# Patient Record
Sex: FEMALE | Race: OTHER | URBAN - METROPOLITAN AREA
[De-identification: names, ages, dates, MRNs, and addresses within clinical notes are randomized per-mention and may not be internally consistent; named-entity substitution may affect disease eponyms.]

---

## 2019-03-18 ENCOUNTER — INPATIENT (INPATIENT)
Facility: HOSPITAL | Age: 84
LOS: 6 days | Discharge: NURSING, REHAB OR LONG-TERM CARE | End: 2019-03-25
Attending: INTERNAL MEDICINE | Admitting: INTERNAL MEDICINE

## 2019-03-18 VITALS
TEMPERATURE: 98 F | HEART RATE: 90 BPM | OXYGEN SATURATION: 97 % | DIASTOLIC BLOOD PRESSURE: 84 MMHG | RESPIRATION RATE: 18 BRPM | SYSTOLIC BLOOD PRESSURE: 191 MMHG

## 2019-03-18 DIAGNOSIS — T50.905A ADVERSE EFFECT OF UNSPECIFIED DRUGS, MEDICAMENTS AND BIOLOGICAL SUBSTANCES, INITIAL ENCOUNTER: ICD-10-CM

## 2019-03-18 DIAGNOSIS — K58.0 IRRITABLE BOWEL SYNDROME WITH DIARRHEA: ICD-10-CM

## 2019-03-18 DIAGNOSIS — K52.1 TOXIC GASTROENTERITIS AND COLITIS: ICD-10-CM
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
## 2019-03-18 PROBLEM — Z00.00 ENCOUNTER FOR PREVENTIVE HEALTH EXAMINATION: Status: ACTIVE | Noted: 2019-03-18

## 2019-03-18 LAB
ALBUMIN SERPL ELPH-MCNC: 4.4 G/DL — SIGNIFICANT CHANGE UP (ref 3.5–5.2)
ALP SERPL-CCNC: 58 U/L — SIGNIFICANT CHANGE UP (ref 30–115)
ALT FLD-CCNC: 16 U/L — SIGNIFICANT CHANGE UP (ref 0–41)
ANION GAP SERPL CALC-SCNC: 11 MMOL/L — SIGNIFICANT CHANGE UP (ref 7–14)
APTT BLD: 31.9 SEC — SIGNIFICANT CHANGE UP (ref 27–39.2)
AST SERPL-CCNC: 21 U/L — SIGNIFICANT CHANGE UP (ref 0–41)
BASOPHILS # BLD AUTO: 0.03 K/UL — SIGNIFICANT CHANGE UP (ref 0–0.2)
BASOPHILS NFR BLD AUTO: 0.2 % — SIGNIFICANT CHANGE UP (ref 0–1)
BILIRUB SERPL-MCNC: 0.3 MG/DL — SIGNIFICANT CHANGE UP (ref 0.2–1.2)
BUN SERPL-MCNC: 14 MG/DL — SIGNIFICANT CHANGE UP (ref 10–20)
CALCIUM SERPL-MCNC: 9.8 MG/DL — SIGNIFICANT CHANGE UP (ref 8.5–10.1)
CHLORIDE SERPL-SCNC: 110 MMOL/L — SIGNIFICANT CHANGE UP (ref 98–110)
CO2 SERPL-SCNC: 21 MMOL/L — SIGNIFICANT CHANGE UP (ref 17–32)
CREAT SERPL-MCNC: 0.8 MG/DL — SIGNIFICANT CHANGE UP (ref 0.7–1.5)
EOSINOPHIL # BLD AUTO: 0 K/UL — SIGNIFICANT CHANGE UP (ref 0–0.7)
EOSINOPHIL NFR BLD AUTO: 0 % — SIGNIFICANT CHANGE UP (ref 0–8)
ETHANOL SERPL-MCNC: <10 MG/DL — HIGH
GLUCOSE SERPL-MCNC: 112 MG/DL — HIGH (ref 70–99)
HCT VFR BLD CALC: 43.7 % — SIGNIFICANT CHANGE UP (ref 37–47)
HGB BLD-MCNC: 14.3 G/DL — SIGNIFICANT CHANGE UP (ref 12–16)
IMM GRANULOCYTES NFR BLD AUTO: 0.4 % — HIGH (ref 0.1–0.3)
INR BLD: 0.99 RATIO — SIGNIFICANT CHANGE UP (ref 0.65–1.3)
LYMPHOCYTES # BLD AUTO: 1.65 K/UL — SIGNIFICANT CHANGE UP (ref 1.2–3.4)
LYMPHOCYTES # BLD AUTO: 12.2 % — LOW (ref 20.5–51.1)
MCHC RBC-ENTMCNC: 30 PG — SIGNIFICANT CHANGE UP (ref 27–31)
MCHC RBC-ENTMCNC: 32.7 G/DL — SIGNIFICANT CHANGE UP (ref 32–37)
MCV RBC AUTO: 91.6 FL — SIGNIFICANT CHANGE UP (ref 81–99)
MONOCYTES # BLD AUTO: 0.8 K/UL — HIGH (ref 0.1–0.6)
MONOCYTES NFR BLD AUTO: 5.9 % — SIGNIFICANT CHANGE UP (ref 1.7–9.3)
NEUTROPHILS # BLD AUTO: 10.96 K/UL — HIGH (ref 1.4–6.5)
NEUTROPHILS NFR BLD AUTO: 81.3 % — HIGH (ref 42.2–75.2)
NRBC # BLD: 0 /100 WBCS — SIGNIFICANT CHANGE UP (ref 0–0)
PLATELET # BLD AUTO: 190 K/UL — SIGNIFICANT CHANGE UP (ref 130–400)
POTASSIUM SERPL-MCNC: 4.2 MMOL/L — SIGNIFICANT CHANGE UP (ref 3.5–5)
POTASSIUM SERPL-SCNC: 4.2 MMOL/L — SIGNIFICANT CHANGE UP (ref 3.5–5)
PROT SERPL-MCNC: 6.8 G/DL — SIGNIFICANT CHANGE UP (ref 6–8)
PROTHROM AB SERPL-ACNC: 11.4 SEC — SIGNIFICANT CHANGE UP (ref 9.95–12.87)
RBC # BLD: 4.77 M/UL — SIGNIFICANT CHANGE UP (ref 4.2–5.4)
RBC # FLD: 13.7 % — SIGNIFICANT CHANGE UP (ref 11.5–14.5)
SODIUM SERPL-SCNC: 142 MMOL/L — SIGNIFICANT CHANGE UP (ref 135–146)
WBC # BLD: 13.49 K/UL — HIGH (ref 4.8–10.8)
WBC # FLD AUTO: 13.49 K/UL — HIGH (ref 4.8–10.8)

## 2019-03-18 RX ORDER — MORPHINE SULFATE 50 MG/1
2 CAPSULE, EXTENDED RELEASE ORAL ONCE
Qty: 0 | Refills: 0 | Status: DISCONTINUED | OUTPATIENT
Start: 2019-03-18 | End: 2019-03-18

## 2019-03-18 RX ORDER — MORPHINE SULFATE 50 MG/1
4 CAPSULE, EXTENDED RELEASE ORAL ONCE
Qty: 0 | Refills: 0 | Status: DISCONTINUED | OUTPATIENT
Start: 2019-03-18 | End: 2019-03-18

## 2019-03-18 RX ORDER — SODIUM CHLORIDE 9 MG/ML
1000 INJECTION INTRAMUSCULAR; INTRAVENOUS; SUBCUTANEOUS
Qty: 0 | Refills: 0 | Status: DISCONTINUED | OUTPATIENT
Start: 2019-03-18 | End: 2019-03-20

## 2019-03-18 RX ADMIN — SODIUM CHLORIDE 125 MILLILITER(S): 9 INJECTION INTRAMUSCULAR; INTRAVENOUS; SUBCUTANEOUS at 22:18

## 2019-03-18 RX ADMIN — MORPHINE SULFATE 2 MILLIGRAM(S): 50 CAPSULE, EXTENDED RELEASE ORAL at 22:21

## 2019-03-18 NOTE — ED ADULT NURSE NOTE - NSIMPLEMENTINTERV_GEN_ALL_ED
Implemented All Universal Safety Interventions:  [place] to call system. Call bell, personal items and telephone within reach. Instruct patient to call for assistance. Room bathroom lighting operational. Non-slip footwear when patient is off stretcher. Physically safe environment: no spills, clutter or unnecessary equipment. Stretcher in lowest position, wheels locked, appropriate side rails in place.
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
## 2019-03-19 LAB — TROPONIN T SERPL-MCNC: <0.01 NG/ML — SIGNIFICANT CHANGE UP

## 2019-03-19 RX ORDER — ATORVASTATIN CALCIUM 80 MG/1
40 TABLET, FILM COATED ORAL AT BEDTIME
Qty: 0 | Refills: 0 | Status: DISCONTINUED | OUTPATIENT
Start: 2019-03-19 | End: 2019-03-25

## 2019-03-19 RX ORDER — MECLIZINE HCL 12.5 MG
25 TABLET ORAL THREE TIMES A DAY
Qty: 0 | Refills: 0 | Status: DISCONTINUED | OUTPATIENT
Start: 2019-03-19 | End: 2019-03-25

## 2019-03-19 RX ORDER — ENOXAPARIN SODIUM 100 MG/ML
40 INJECTION SUBCUTANEOUS DAILY
Qty: 0 | Refills: 0 | Status: DISCONTINUED | OUTPATIENT
Start: 2019-03-19 | End: 2019-03-25

## 2019-03-19 RX ORDER — LIDOCAINE 4 G/100G
1 CREAM TOPICAL DAILY
Qty: 0 | Refills: 0 | Status: DISCONTINUED | OUTPATIENT
Start: 2019-03-19 | End: 2019-03-25

## 2019-03-19 RX ORDER — METOPROLOL TARTRATE 50 MG
25 TABLET ORAL
Qty: 0 | Refills: 0 | Status: DISCONTINUED | OUTPATIENT
Start: 2019-03-19 | End: 2019-03-25

## 2019-03-19 RX ORDER — ACETAMINOPHEN 500 MG
650 TABLET ORAL EVERY 6 HOURS
Qty: 0 | Refills: 0 | Status: DISCONTINUED | OUTPATIENT
Start: 2019-03-19 | End: 2019-03-20

## 2019-03-19 RX ORDER — CHLORHEXIDINE GLUCONATE 213 G/1000ML
1 SOLUTION TOPICAL
Qty: 0 | Refills: 0 | Status: DISCONTINUED | OUTPATIENT
Start: 2019-03-19 | End: 2019-03-25

## 2019-03-19 RX ORDER — CLOPIDOGREL BISULFATE 75 MG/1
75 TABLET, FILM COATED ORAL DAILY
Qty: 0 | Refills: 0 | Status: DISCONTINUED | OUTPATIENT
Start: 2019-03-19 | End: 2019-03-25

## 2019-03-19 RX ADMIN — Medication 25 MILLIGRAM(S): at 07:37

## 2019-03-19 RX ADMIN — CLOPIDOGREL BISULFATE 75 MILLIGRAM(S): 75 TABLET, FILM COATED ORAL at 11:33

## 2019-03-19 RX ADMIN — SODIUM CHLORIDE 125 MILLILITER(S): 9 INJECTION INTRAMUSCULAR; INTRAVENOUS; SUBCUTANEOUS at 22:25

## 2019-03-19 RX ADMIN — MORPHINE SULFATE 2 MILLIGRAM(S): 50 CAPSULE, EXTENDED RELEASE ORAL at 00:15

## 2019-03-19 RX ADMIN — Medication 650 MILLIGRAM(S): at 07:50

## 2019-03-19 RX ADMIN — Medication 10 MILLIGRAM(S): at 11:28

## 2019-03-19 RX ADMIN — MORPHINE SULFATE 4 MILLIGRAM(S): 50 CAPSULE, EXTENDED RELEASE ORAL at 00:55

## 2019-03-19 RX ADMIN — Medication 650 MILLIGRAM(S): at 22:09

## 2019-03-19 RX ADMIN — Medication 650 MILLIGRAM(S): at 13:17

## 2019-03-19 RX ADMIN — ENOXAPARIN SODIUM 40 MILLIGRAM(S): 100 INJECTION SUBCUTANEOUS at 11:28

## 2019-03-19 RX ADMIN — ATORVASTATIN CALCIUM 40 MILLIGRAM(S): 80 TABLET, FILM COATED ORAL at 21:17

## 2019-03-19 RX ADMIN — LIDOCAINE 1 PATCH: 4 CREAM TOPICAL at 22:35

## 2019-03-19 RX ADMIN — Medication 650 MILLIGRAM(S): at 23:32

## 2019-03-19 RX ADMIN — Medication 650 MILLIGRAM(S): at 07:22

## 2019-03-19 RX ADMIN — LIDOCAINE 1 PATCH: 4 CREAM TOPICAL at 19:58

## 2019-03-19 RX ADMIN — LIDOCAINE 1 PATCH: 4 CREAM TOPICAL at 11:27

## 2019-03-19 RX ADMIN — Medication 25 MILLIGRAM(S): at 17:05

## 2019-03-19 NOTE — ED PROVIDER NOTE - PHYSICAL EXAMINATION
CONSTITUTIONAL: Well-appearing; well-nourished; in no apparent distress.   EYES: PERRL; EOM intact.   CARDIOVASCULAR: Normal S1, S2; no murmurs, rubs, or gallops.   RESPIRATORY: Normal chest excursion with respiration; breath sounds clear and equal bilaterally; no wheezes, rhonchi, or rales.  GI/: Normal bowel sounds; non-distended; non-tender; no palpable organomegaly.   MS: + ttp over the b/l anterior chest wall. no crepitus. + ttp over the anterior right knee with painful ROM. + mild joint effusion. 2+ DP pules. right leg n/v intact. b/l hip non-tender. no no pain on ROM. pelvis stable.   SKIN: + mild ecchymosis noted to right anterior chest and right knee.   NEURO/PSYCH: A & O x 4; grossly unremarkable.
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
## 2019-03-19 NOTE — ED PROVIDER NOTE - CLINICAL SUMMARY MEDICAL DECISION MAKING FREE TEXT BOX
I personally evaluated the patient. I reviewed the Resident’s or Physician Assistant’s note (as assigned above), and agree with the findings and plan except as documented in my note. Patient is unable to ambulate, Patient ct displays no evidence of tibial platue fracture. Will admit for inaibility to ambulate
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
## 2019-03-19 NOTE — ED PROVIDER NOTE - PROGRESS NOTE DETAILS
attempted to walk patient, patient couldn't ambulate CT knee ordered CT knee neg for fracture. unable to ambulate. will admit for further management
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
## 2019-03-20 LAB
ANION GAP SERPL CALC-SCNC: 13 MMOL/L — SIGNIFICANT CHANGE UP (ref 7–14)
BASOPHILS # BLD AUTO: 0.03 K/UL — SIGNIFICANT CHANGE UP (ref 0–0.2)
BASOPHILS NFR BLD AUTO: 0.3 % — SIGNIFICANT CHANGE UP (ref 0–1)
BUN SERPL-MCNC: 8 MG/DL — LOW (ref 10–20)
CALCIUM SERPL-MCNC: 8.8 MG/DL — SIGNIFICANT CHANGE UP (ref 8.5–10.1)
CHLORIDE SERPL-SCNC: 109 MMOL/L — SIGNIFICANT CHANGE UP (ref 98–110)
CO2 SERPL-SCNC: 22 MMOL/L — SIGNIFICANT CHANGE UP (ref 17–32)
CREAT SERPL-MCNC: 0.6 MG/DL — LOW (ref 0.7–1.5)
EOSINOPHIL # BLD AUTO: 0 K/UL — SIGNIFICANT CHANGE UP (ref 0–0.7)
EOSINOPHIL NFR BLD AUTO: 0 % — SIGNIFICANT CHANGE UP (ref 0–8)
GLUCOSE SERPL-MCNC: 99 MG/DL — SIGNIFICANT CHANGE UP (ref 70–99)
HCT VFR BLD CALC: 42.2 % — SIGNIFICANT CHANGE UP (ref 37–47)
HGB BLD-MCNC: 13.7 G/DL — SIGNIFICANT CHANGE UP (ref 12–16)
IMM GRANULOCYTES NFR BLD AUTO: 0.4 % — HIGH (ref 0.1–0.3)
LYMPHOCYTES # BLD AUTO: 1.38 K/UL — SIGNIFICANT CHANGE UP (ref 1.2–3.4)
LYMPHOCYTES # BLD AUTO: 11.6 % — LOW (ref 20.5–51.1)
MAGNESIUM SERPL-MCNC: 1.9 MG/DL — SIGNIFICANT CHANGE UP (ref 1.8–2.4)
MCHC RBC-ENTMCNC: 29.7 PG — SIGNIFICANT CHANGE UP (ref 27–31)
MCHC RBC-ENTMCNC: 32.5 G/DL — SIGNIFICANT CHANGE UP (ref 32–37)
MCV RBC AUTO: 91.3 FL — SIGNIFICANT CHANGE UP (ref 81–99)
MONOCYTES # BLD AUTO: 0.57 K/UL — SIGNIFICANT CHANGE UP (ref 0.1–0.6)
MONOCYTES NFR BLD AUTO: 4.8 % — SIGNIFICANT CHANGE UP (ref 1.7–9.3)
NEUTROPHILS # BLD AUTO: 9.9 K/UL — HIGH (ref 1.4–6.5)
NEUTROPHILS NFR BLD AUTO: 82.9 % — HIGH (ref 42.2–75.2)
NRBC # BLD: 0 /100 WBCS — SIGNIFICANT CHANGE UP (ref 0–0)
PHOSPHATE SERPL-MCNC: 2.5 MG/DL — SIGNIFICANT CHANGE UP (ref 2.1–4.9)
PLATELET # BLD AUTO: 231 K/UL — SIGNIFICANT CHANGE UP (ref 130–400)
POTASSIUM SERPL-MCNC: 3.7 MMOL/L — SIGNIFICANT CHANGE UP (ref 3.5–5)
POTASSIUM SERPL-SCNC: 3.7 MMOL/L — SIGNIFICANT CHANGE UP (ref 3.5–5)
RBC # BLD: 4.62 M/UL — SIGNIFICANT CHANGE UP (ref 4.2–5.4)
RBC # FLD: 13.8 % — SIGNIFICANT CHANGE UP (ref 11.5–14.5)
SODIUM SERPL-SCNC: 144 MMOL/L — SIGNIFICANT CHANGE UP (ref 135–146)
WBC # BLD: 11.93 K/UL — HIGH (ref 4.8–10.8)
WBC # FLD AUTO: 11.93 K/UL — HIGH (ref 4.8–10.8)

## 2019-03-20 RX ORDER — OXYCODONE HYDROCHLORIDE 5 MG/1
5 TABLET ORAL EVERY 6 HOURS
Qty: 0 | Refills: 0 | Status: DISCONTINUED | OUTPATIENT
Start: 2019-03-20 | End: 2019-03-20

## 2019-03-20 RX ORDER — ACETAMINOPHEN 500 MG
650 TABLET ORAL EVERY 6 HOURS
Qty: 0 | Refills: 0 | Status: DISCONTINUED | OUTPATIENT
Start: 2019-03-20 | End: 2019-03-24

## 2019-03-20 RX ORDER — ONDANSETRON 8 MG/1
4 TABLET, FILM COATED ORAL ONCE
Qty: 0 | Refills: 0 | Status: COMPLETED | OUTPATIENT
Start: 2019-03-20 | End: 2019-03-20

## 2019-03-20 RX ORDER — TRAMADOL HYDROCHLORIDE 50 MG/1
50 TABLET ORAL EVERY 6 HOURS
Qty: 0 | Refills: 0 | Status: DISCONTINUED | OUTPATIENT
Start: 2019-03-20 | End: 2019-03-23

## 2019-03-20 RX ADMIN — Medication 650 MILLIGRAM(S): at 09:45

## 2019-03-20 RX ADMIN — ONDANSETRON 4 MILLIGRAM(S): 8 TABLET, FILM COATED ORAL at 06:22

## 2019-03-20 RX ADMIN — Medication 650 MILLIGRAM(S): at 17:06

## 2019-03-20 RX ADMIN — Medication 650 MILLIGRAM(S): at 05:17

## 2019-03-20 RX ADMIN — Medication 650 MILLIGRAM(S): at 06:26

## 2019-03-20 RX ADMIN — CLOPIDOGREL BISULFATE 75 MILLIGRAM(S): 75 TABLET, FILM COATED ORAL at 11:24

## 2019-03-20 RX ADMIN — ATORVASTATIN CALCIUM 40 MILLIGRAM(S): 80 TABLET, FILM COATED ORAL at 21:38

## 2019-03-20 RX ADMIN — Medication 650 MILLIGRAM(S): at 08:42

## 2019-03-20 RX ADMIN — Medication 25 MILLIGRAM(S): at 05:15

## 2019-03-20 RX ADMIN — Medication 10 MILLIGRAM(S): at 11:24

## 2019-03-20 RX ADMIN — Medication 650 MILLIGRAM(S): at 12:32

## 2019-03-20 RX ADMIN — LIDOCAINE 1 PATCH: 4 CREAM TOPICAL at 19:30

## 2019-03-20 RX ADMIN — Medication 650 MILLIGRAM(S): at 11:24

## 2019-03-20 RX ADMIN — LIDOCAINE 1 PATCH: 4 CREAM TOPICAL at 23:25

## 2019-03-20 RX ADMIN — Medication 25 MILLIGRAM(S): at 17:06

## 2019-03-20 RX ADMIN — Medication 650 MILLIGRAM(S): at 23:26

## 2019-03-20 RX ADMIN — LIDOCAINE 1 PATCH: 4 CREAM TOPICAL at 11:25

## 2019-03-20 RX ADMIN — Medication 650 MILLIGRAM(S): at 17:39

## 2019-03-20 RX ADMIN — SODIUM CHLORIDE 125 MILLILITER(S): 9 INJECTION INTRAMUSCULAR; INTRAVENOUS; SUBCUTANEOUS at 05:51

## 2019-03-20 RX ADMIN — ENOXAPARIN SODIUM 40 MILLIGRAM(S): 100 INJECTION SUBCUTANEOUS at 11:25
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
## 2019-03-20 NOTE — PHYSICAL THERAPY INITIAL EVALUATION ADULT - LEVEL OF INDEPENDENCE: GAIT, REHAB EVAL
Alert and oriented to person, place, time/situation. normal mood and affect. no apparent risk to self or others.
contact guard

## 2019-03-20 NOTE — PHYSICAL THERAPY INITIAL EVALUATION ADULT - IMPAIRMENTS CONTRIBUTING TO GAIT DEVIATIONS, PT EVAL
Pt with c/o pain in her R breast with each step when she places R foot on the floor and bears weight/pain/impaired balance

## 2019-03-21 ENCOUNTER — TRANSCRIPTION ENCOUNTER (OUTPATIENT)
Age: 84
End: 2019-03-21

## 2019-03-21 LAB
ANION GAP SERPL CALC-SCNC: 12 MMOL/L — SIGNIFICANT CHANGE UP (ref 7–14)
BUN SERPL-MCNC: 12 MG/DL — SIGNIFICANT CHANGE UP (ref 10–20)
CALCIUM SERPL-MCNC: 8.9 MG/DL — SIGNIFICANT CHANGE UP (ref 8.5–10.1)
CHLORIDE SERPL-SCNC: 111 MMOL/L — HIGH (ref 98–110)
CO2 SERPL-SCNC: 24 MMOL/L — SIGNIFICANT CHANGE UP (ref 17–32)
CREAT SERPL-MCNC: 0.6 MG/DL — LOW (ref 0.7–1.5)
GLUCOSE SERPL-MCNC: 91 MG/DL — SIGNIFICANT CHANGE UP (ref 70–99)
HCT VFR BLD CALC: 38.1 % — SIGNIFICANT CHANGE UP (ref 37–47)
HGB BLD-MCNC: 12.2 G/DL — SIGNIFICANT CHANGE UP (ref 12–16)
MCHC RBC-ENTMCNC: 29.4 PG — SIGNIFICANT CHANGE UP (ref 27–31)
MCHC RBC-ENTMCNC: 32 G/DL — SIGNIFICANT CHANGE UP (ref 32–37)
MCV RBC AUTO: 91.8 FL — SIGNIFICANT CHANGE UP (ref 81–99)
NRBC # BLD: 0 /100 WBCS — SIGNIFICANT CHANGE UP (ref 0–0)
PLATELET # BLD AUTO: 224 K/UL — SIGNIFICANT CHANGE UP (ref 130–400)
POTASSIUM SERPL-MCNC: 3.9 MMOL/L — SIGNIFICANT CHANGE UP (ref 3.5–5)
POTASSIUM SERPL-SCNC: 3.9 MMOL/L — SIGNIFICANT CHANGE UP (ref 3.5–5)
RBC # BLD: 4.15 M/UL — LOW (ref 4.2–5.4)
RBC # FLD: 13.7 % — SIGNIFICANT CHANGE UP (ref 11.5–14.5)
SODIUM SERPL-SCNC: 147 MMOL/L — HIGH (ref 135–146)
WBC # BLD: 10.04 K/UL — SIGNIFICANT CHANGE UP (ref 4.8–10.8)
WBC # FLD AUTO: 10.04 K/UL — SIGNIFICANT CHANGE UP (ref 4.8–10.8)

## 2019-03-21 RX ORDER — METHOCARBAMOL 500 MG/1
750 TABLET, FILM COATED ORAL EVERY 8 HOURS
Qty: 0 | Refills: 0 | Status: DISCONTINUED | OUTPATIENT
Start: 2019-03-21 | End: 2019-03-23

## 2019-03-21 RX ORDER — ACETAMINOPHEN 500 MG
2 TABLET ORAL
Qty: 0 | Refills: 0 | DISCHARGE
Start: 2019-03-21

## 2019-03-21 RX ORDER — PANTOPRAZOLE SODIUM 20 MG/1
40 TABLET, DELAYED RELEASE ORAL
Qty: 0 | Refills: 0 | Status: DISCONTINUED | OUTPATIENT
Start: 2019-03-21 | End: 2019-03-25

## 2019-03-21 RX ORDER — TRAMADOL HYDROCHLORIDE 50 MG/1
1 TABLET ORAL
Qty: 0 | Refills: 0 | DISCHARGE
Start: 2019-03-21

## 2019-03-21 RX ORDER — ACETAMINOPHEN 500 MG
2 TABLET ORAL
Qty: 0 | Refills: 0 | COMMUNITY
Start: 2019-03-21

## 2019-03-21 RX ADMIN — Medication 650 MILLIGRAM(S): at 12:30

## 2019-03-21 RX ADMIN — ATORVASTATIN CALCIUM 40 MILLIGRAM(S): 80 TABLET, FILM COATED ORAL at 21:14

## 2019-03-21 RX ADMIN — Medication 25 MILLIGRAM(S): at 05:03

## 2019-03-21 RX ADMIN — METHOCARBAMOL 750 MILLIGRAM(S): 500 TABLET, FILM COATED ORAL at 13:13

## 2019-03-21 RX ADMIN — METHOCARBAMOL 750 MILLIGRAM(S): 500 TABLET, FILM COATED ORAL at 08:53

## 2019-03-21 RX ADMIN — Medication 10 MILLIGRAM(S): at 12:30

## 2019-03-21 RX ADMIN — METHOCARBAMOL 750 MILLIGRAM(S): 500 TABLET, FILM COATED ORAL at 21:13

## 2019-03-21 RX ADMIN — Medication 650 MILLIGRAM(S): at 05:03

## 2019-03-21 RX ADMIN — ENOXAPARIN SODIUM 40 MILLIGRAM(S): 100 INJECTION SUBCUTANEOUS at 12:31

## 2019-03-21 RX ADMIN — CLOPIDOGREL BISULFATE 75 MILLIGRAM(S): 75 TABLET, FILM COATED ORAL at 12:30

## 2019-03-21 RX ADMIN — Medication 25 MILLIGRAM(S): at 17:44

## 2019-03-21 RX ADMIN — Medication 650 MILLIGRAM(S): at 17:44

## 2019-03-21 RX ADMIN — Medication 650 MILLIGRAM(S): at 00:06

## 2019-03-21 RX ADMIN — LIDOCAINE 1 PATCH: 4 CREAM TOPICAL at 12:31

## 2019-03-21 RX ADMIN — Medication 650 MILLIGRAM(S): at 06:27

## 2019-03-21 RX ADMIN — TRAMADOL HYDROCHLORIDE 50 MILLIGRAM(S): 50 TABLET ORAL at 21:13
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
## 2019-03-21 NOTE — DISCHARGE NOTE PROVIDER - CARE PROVIDER_API CALL
Galen Moralez)  Internal Medicine  [address] [place] [address]  Phone: [phone]  Fax: [phone]  Follow Up Time:
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
## 2019-03-22 ENCOUNTER — TRANSCRIPTION ENCOUNTER (OUTPATIENT)
Age: 84
End: 2019-03-22

## 2019-03-22 LAB
ANION GAP SERPL CALC-SCNC: 12 MMOL/L — SIGNIFICANT CHANGE UP (ref 7–14)
BUN SERPL-MCNC: 13 MG/DL — SIGNIFICANT CHANGE UP (ref 10–20)
CALCIUM SERPL-MCNC: 9.1 MG/DL — SIGNIFICANT CHANGE UP (ref 8.5–10.1)
CHLORIDE SERPL-SCNC: 109 MMOL/L — SIGNIFICANT CHANGE UP (ref 98–110)
CO2 SERPL-SCNC: 23 MMOL/L — SIGNIFICANT CHANGE UP (ref 17–32)
CREAT SERPL-MCNC: 0.6 MG/DL — LOW (ref 0.7–1.5)
GLUCOSE SERPL-MCNC: 93 MG/DL — SIGNIFICANT CHANGE UP (ref 70–99)
HCT VFR BLD CALC: 39.1 % — SIGNIFICANT CHANGE UP (ref 37–47)
HGB BLD-MCNC: 12.6 G/DL — SIGNIFICANT CHANGE UP (ref 12–16)
MCHC RBC-ENTMCNC: 29.6 PG — SIGNIFICANT CHANGE UP (ref 27–31)
MCHC RBC-ENTMCNC: 32.2 G/DL — SIGNIFICANT CHANGE UP (ref 32–37)
MCV RBC AUTO: 91.8 FL — SIGNIFICANT CHANGE UP (ref 81–99)
NRBC # BLD: 0 /100 WBCS — SIGNIFICANT CHANGE UP (ref 0–0)
PLATELET # BLD AUTO: 253 K/UL — SIGNIFICANT CHANGE UP (ref 130–400)
POTASSIUM SERPL-MCNC: 3.7 MMOL/L — SIGNIFICANT CHANGE UP (ref 3.5–5)
POTASSIUM SERPL-SCNC: 3.7 MMOL/L — SIGNIFICANT CHANGE UP (ref 3.5–5)
RBC # BLD: 4.26 M/UL — SIGNIFICANT CHANGE UP (ref 4.2–5.4)
RBC # FLD: 13.6 % — SIGNIFICANT CHANGE UP (ref 11.5–14.5)
SODIUM SERPL-SCNC: 144 MMOL/L — SIGNIFICANT CHANGE UP (ref 135–146)
WBC # BLD: 10.93 K/UL — HIGH (ref 4.8–10.8)
WBC # FLD AUTO: 10.93 K/UL — HIGH (ref 4.8–10.8)

## 2019-03-22 RX ORDER — METHOCARBAMOL 500 MG/1
1 TABLET, FILM COATED ORAL
Qty: 0 | Refills: 0 | COMMUNITY
Start: 2019-03-22

## 2019-03-22 RX ORDER — PANTOPRAZOLE SODIUM 20 MG/1
1 TABLET, DELAYED RELEASE ORAL
Qty: 0 | Refills: 0 | DISCHARGE
Start: 2019-03-22

## 2019-03-22 RX ORDER — LIDOCAINE 4 G/100G
0 CREAM TOPICAL
Qty: 0 | Refills: 0 | DISCHARGE
Start: 2019-03-22

## 2019-03-22 RX ORDER — METOPROLOL TARTRATE 50 MG
1 TABLET ORAL
Qty: 0 | Refills: 0 | DISCHARGE
Start: 2019-03-22

## 2019-03-22 RX ORDER — MECLIZINE HCL 12.5 MG
1 TABLET ORAL
Qty: 0 | Refills: 0 | DISCHARGE
Start: 2019-03-22

## 2019-03-22 RX ORDER — CLOPIDOGREL BISULFATE 75 MG/1
1 TABLET, FILM COATED ORAL
Qty: 0 | Refills: 0 | DISCHARGE
Start: 2019-03-22

## 2019-03-22 RX ORDER — ATORVASTATIN CALCIUM 80 MG/1
1 TABLET, FILM COATED ORAL
Qty: 0 | Refills: 0 | DISCHARGE
Start: 2019-03-22

## 2019-03-22 RX ADMIN — ENOXAPARIN SODIUM 40 MILLIGRAM(S): 100 INJECTION SUBCUTANEOUS at 11:31

## 2019-03-22 RX ADMIN — LIDOCAINE 1 PATCH: 4 CREAM TOPICAL at 11:31

## 2019-03-22 RX ADMIN — Medication 650 MILLIGRAM(S): at 00:36

## 2019-03-22 RX ADMIN — Medication 650 MILLIGRAM(S): at 11:31

## 2019-03-22 RX ADMIN — CLOPIDOGREL BISULFATE 75 MILLIGRAM(S): 75 TABLET, FILM COATED ORAL at 11:31

## 2019-03-22 RX ADMIN — METHOCARBAMOL 750 MILLIGRAM(S): 500 TABLET, FILM COATED ORAL at 13:14

## 2019-03-22 RX ADMIN — Medication 650 MILLIGRAM(S): at 17:43

## 2019-03-22 RX ADMIN — LIDOCAINE 1 PATCH: 4 CREAM TOPICAL at 00:32

## 2019-03-22 RX ADMIN — PANTOPRAZOLE SODIUM 40 MILLIGRAM(S): 20 TABLET, DELAYED RELEASE ORAL at 05:36

## 2019-03-22 RX ADMIN — LIDOCAINE 1 PATCH: 4 CREAM TOPICAL at 22:37

## 2019-03-22 RX ADMIN — ATORVASTATIN CALCIUM 40 MILLIGRAM(S): 80 TABLET, FILM COATED ORAL at 21:22

## 2019-03-22 RX ADMIN — Medication 25 MILLIGRAM(S): at 17:43

## 2019-03-22 RX ADMIN — Medication 25 MILLIGRAM(S): at 05:38

## 2019-03-22 RX ADMIN — LIDOCAINE 1 PATCH: 4 CREAM TOPICAL at 07:20

## 2019-03-22 RX ADMIN — Medication 650 MILLIGRAM(S): at 05:36

## 2019-03-22 RX ADMIN — Medication 10 MILLIGRAM(S): at 11:31

## 2019-03-22 RX ADMIN — TRAMADOL HYDROCHLORIDE 50 MILLIGRAM(S): 50 TABLET ORAL at 05:37

## 2019-03-22 RX ADMIN — METHOCARBAMOL 750 MILLIGRAM(S): 500 TABLET, FILM COATED ORAL at 21:22

## 2019-03-22 RX ADMIN — METHOCARBAMOL 750 MILLIGRAM(S): 500 TABLET, FILM COATED ORAL at 05:38
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
## 2019-03-22 NOTE — DISCHARGE NOTE NURSING/CASE MANAGEMENT/SOCIAL WORK - NSDCDPATPORTLINK_GEN_ALL_CORE
You can access the [facility], offered by [facility], by registering with the following website: [email or web address]
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
## 2019-03-23 LAB
C DIFF BY PCR RESULT: NEGATIVE — SIGNIFICANT CHANGE UP
C DIFF TOX GENS STL QL NAA+PROBE: SIGNIFICANT CHANGE UP

## 2019-03-23 RX ORDER — LOPERAMIDE HCL 2 MG
2 TABLET ORAL THREE TIMES A DAY
Qty: 0 | Refills: 0 | Status: DISCONTINUED | OUTPATIENT
Start: 2019-03-23 | End: 2019-03-24

## 2019-03-23 RX ORDER — LACTOBACILLUS ACIDOPHILUS 100MM CELL
1 CAPSULE ORAL
Qty: 0 | Refills: 0 | Status: DISCONTINUED | OUTPATIENT
Start: 2019-03-23 | End: 2019-03-25

## 2019-03-23 RX ADMIN — CLOPIDOGREL BISULFATE 75 MILLIGRAM(S): 75 TABLET, FILM COATED ORAL at 11:14

## 2019-03-23 RX ADMIN — Medication 650 MILLIGRAM(S): at 18:15

## 2019-03-23 RX ADMIN — ENOXAPARIN SODIUM 40 MILLIGRAM(S): 100 INJECTION SUBCUTANEOUS at 11:11

## 2019-03-23 RX ADMIN — Medication 25 MILLIGRAM(S): at 17:49

## 2019-03-23 RX ADMIN — LIDOCAINE 1 PATCH: 4 CREAM TOPICAL at 00:15

## 2019-03-23 RX ADMIN — Medication 25 MILLIGRAM(S): at 06:30

## 2019-03-23 RX ADMIN — Medication 1 TABLET(S): at 11:17

## 2019-03-23 RX ADMIN — PANTOPRAZOLE SODIUM 40 MILLIGRAM(S): 20 TABLET, DELAYED RELEASE ORAL at 06:30

## 2019-03-23 RX ADMIN — Medication 650 MILLIGRAM(S): at 17:48

## 2019-03-23 RX ADMIN — LIDOCAINE 1 PATCH: 4 CREAM TOPICAL at 23:30

## 2019-03-23 RX ADMIN — Medication 650 MILLIGRAM(S): at 23:58

## 2019-03-23 RX ADMIN — Medication 2 MILLIGRAM(S): at 17:49

## 2019-03-23 RX ADMIN — LIDOCAINE 1 PATCH: 4 CREAM TOPICAL at 19:23

## 2019-03-23 RX ADMIN — Medication 650 MILLIGRAM(S): at 11:15

## 2019-03-23 RX ADMIN — ATORVASTATIN CALCIUM 40 MILLIGRAM(S): 80 TABLET, FILM COATED ORAL at 21:23

## 2019-03-23 RX ADMIN — Medication 10 MILLIGRAM(S): at 11:14

## 2019-03-23 RX ADMIN — Medication 2 MILLIGRAM(S): at 12:06

## 2019-03-23 RX ADMIN — Medication 650 MILLIGRAM(S): at 12:07

## 2019-03-23 RX ADMIN — METHOCARBAMOL 750 MILLIGRAM(S): 500 TABLET, FILM COATED ORAL at 06:30

## 2019-03-23 RX ADMIN — LIDOCAINE 1 PATCH: 4 CREAM TOPICAL at 11:11
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
## 2019-03-24 LAB
ANION GAP SERPL CALC-SCNC: 13 MMOL/L — SIGNIFICANT CHANGE UP (ref 7–14)
BUN SERPL-MCNC: 11 MG/DL — SIGNIFICANT CHANGE UP (ref 10–20)
CALCIUM SERPL-MCNC: 9.3 MG/DL — SIGNIFICANT CHANGE UP (ref 8.5–10.1)
CHLORIDE SERPL-SCNC: 103 MMOL/L — SIGNIFICANT CHANGE UP (ref 98–110)
CO2 SERPL-SCNC: 23 MMOL/L — SIGNIFICANT CHANGE UP (ref 17–32)
CREAT SERPL-MCNC: 0.6 MG/DL — LOW (ref 0.7–1.5)
GLUCOSE SERPL-MCNC: 77 MG/DL — SIGNIFICANT CHANGE UP (ref 70–99)
HCT VFR BLD CALC: 42.3 % — SIGNIFICANT CHANGE UP (ref 37–47)
HGB BLD-MCNC: 13.8 G/DL — SIGNIFICANT CHANGE UP (ref 12–16)
MCHC RBC-ENTMCNC: 29.5 PG — SIGNIFICANT CHANGE UP (ref 27–31)
MCHC RBC-ENTMCNC: 32.6 G/DL — SIGNIFICANT CHANGE UP (ref 32–37)
MCV RBC AUTO: 90.4 FL — SIGNIFICANT CHANGE UP (ref 81–99)
NRBC # BLD: 0 /100 WBCS — SIGNIFICANT CHANGE UP (ref 0–0)
PLATELET # BLD AUTO: 292 K/UL — SIGNIFICANT CHANGE UP (ref 130–400)
POTASSIUM SERPL-MCNC: 3.7 MMOL/L — SIGNIFICANT CHANGE UP (ref 3.5–5)
POTASSIUM SERPL-SCNC: 3.7 MMOL/L — SIGNIFICANT CHANGE UP (ref 3.5–5)
RBC # BLD: 4.68 M/UL — SIGNIFICANT CHANGE UP (ref 4.2–5.4)
RBC # FLD: 13.5 % — SIGNIFICANT CHANGE UP (ref 11.5–14.5)
SODIUM SERPL-SCNC: 139 MMOL/L — SIGNIFICANT CHANGE UP (ref 135–146)
WBC # BLD: 8.26 K/UL — SIGNIFICANT CHANGE UP (ref 4.8–10.8)
WBC # FLD AUTO: 8.26 K/UL — SIGNIFICANT CHANGE UP (ref 4.8–10.8)

## 2019-03-24 RX ORDER — LOPERAMIDE HCL 2 MG
2 TABLET ORAL EVERY 4 HOURS
Qty: 0 | Refills: 0 | Status: DISCONTINUED | OUTPATIENT
Start: 2019-03-24 | End: 2019-03-25

## 2019-03-24 RX ADMIN — Medication 2 MILLIGRAM(S): at 17:51

## 2019-03-24 RX ADMIN — Medication 1 TABLET(S): at 17:52

## 2019-03-24 RX ADMIN — Medication 2 MILLIGRAM(S): at 14:00

## 2019-03-24 RX ADMIN — CLOPIDOGREL BISULFATE 75 MILLIGRAM(S): 75 TABLET, FILM COATED ORAL at 12:37

## 2019-03-24 RX ADMIN — Medication 2 MILLIGRAM(S): at 05:19

## 2019-03-24 RX ADMIN — PANTOPRAZOLE SODIUM 40 MILLIGRAM(S): 20 TABLET, DELAYED RELEASE ORAL at 12:37

## 2019-03-24 RX ADMIN — CHLORHEXIDINE GLUCONATE 1 APPLICATION(S): 213 SOLUTION TOPICAL at 05:15

## 2019-03-24 RX ADMIN — Medication 650 MILLIGRAM(S): at 06:10

## 2019-03-24 RX ADMIN — ATORVASTATIN CALCIUM 40 MILLIGRAM(S): 80 TABLET, FILM COATED ORAL at 22:40

## 2019-03-24 RX ADMIN — Medication 25 MILLIGRAM(S): at 17:51

## 2019-03-24 RX ADMIN — Medication 650 MILLIGRAM(S): at 00:30

## 2019-03-24 RX ADMIN — Medication 10 MILLIGRAM(S): at 12:36

## 2019-03-24 RX ADMIN — ENOXAPARIN SODIUM 40 MILLIGRAM(S): 100 INJECTION SUBCUTANEOUS at 12:38

## 2019-03-24 RX ADMIN — Medication 1 TABLET(S): at 12:37

## 2019-03-24 RX ADMIN — Medication 2 MILLIGRAM(S): at 10:00

## 2019-03-24 RX ADMIN — Medication 25 MILLIGRAM(S): at 05:19

## 2019-03-24 RX ADMIN — Medication 2 MILLIGRAM(S): at 22:41

## 2019-03-24 RX ADMIN — Medication 650 MILLIGRAM(S): at 05:19

## 2019-03-24 NOTE — DIETITIAN INITIAL EVALUATION ADULT. - FACTORS AFF FOOD INTAKE
The patient reports consuming <50% of meals secondary to poor appetite; agreed to receive an oral supplement. However, the patient states they do not like thick oral supplements such as ensure enlive but is will to try ensure clears. The patient denies having nausea and vomiting. The patient reports having diarrhea.

## 2019-03-24 NOTE — DIETITIAN INITIAL EVALUATION ADULT. - PHYSICAL APPEARANCE
well nourished/BMI-24; Based on nutrition focused physical examination, the patient appears well nourished with no physical signs of muscle and subcutaneous fat wasting.
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
## 2019-03-24 NOTE — DIETITIAN INITIAL EVALUATION ADULT. - ORAL INTAKE PTA
The patient reports following a regular diet at home; consumed 4-5 small meals daily at 100%; took centrum for women, fish oil and just 10 supplements daily./good

## 2019-03-24 NOTE — DIETITIAN INITIAL EVALUATION ADULT. - ENERGY NEEDS
Estimated Calorie Needs: 1136 x AF 1.2-1.0=8961-3538qrha/day   Estimated Protein Needs: 66-80grams/day (1-1.2grams/kg of admit weight)  Estimated Fluid Needs: 1363-1477mL/day (1mL/kcal)
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
## 2019-03-25 VITALS
HEART RATE: 81 BPM | SYSTOLIC BLOOD PRESSURE: 135 MMHG | RESPIRATION RATE: 18 BRPM | DIASTOLIC BLOOD PRESSURE: 66 MMHG | TEMPERATURE: 98 F

## 2019-03-25 RX ORDER — ONDANSETRON 8 MG/1
4 TABLET, FILM COATED ORAL ONCE
Qty: 0 | Refills: 0 | Status: COMPLETED | OUTPATIENT
Start: 2019-03-25 | End: 2019-03-25

## 2019-03-25 RX ORDER — LOPERAMIDE HCL 2 MG
1 TABLET ORAL
Qty: 4 | Refills: 0
Start: 2019-03-25 | End: 2019-03-25

## 2019-03-25 RX ORDER — LACTOBACILLUS ACIDOPHILUS 100MM CELL
0 CAPSULE ORAL
Qty: 0 | Refills: 0 | DISCHARGE
Start: 2019-03-25

## 2019-03-25 RX ADMIN — Medication 25 MILLIGRAM(S): at 06:14

## 2019-03-25 RX ADMIN — Medication 2 MILLIGRAM(S): at 01:07

## 2019-03-25 RX ADMIN — PANTOPRAZOLE SODIUM 40 MILLIGRAM(S): 20 TABLET, DELAYED RELEASE ORAL at 06:14

## 2019-03-25 RX ADMIN — Medication 2 MILLIGRAM(S): at 13:39

## 2019-03-25 RX ADMIN — Medication 2 MILLIGRAM(S): at 11:27

## 2019-03-25 RX ADMIN — ENOXAPARIN SODIUM 40 MILLIGRAM(S): 100 INJECTION SUBCUTANEOUS at 11:30

## 2019-03-25 RX ADMIN — Medication 2 MILLIGRAM(S): at 06:14

## 2019-03-25 RX ADMIN — Medication 10 MILLIGRAM(S): at 11:28

## 2019-03-25 RX ADMIN — CHLORHEXIDINE GLUCONATE 1 APPLICATION(S): 213 SOLUTION TOPICAL at 06:12

## 2019-03-25 RX ADMIN — Medication 1 TABLET(S): at 08:30

## 2019-03-25 RX ADMIN — CLOPIDOGREL BISULFATE 75 MILLIGRAM(S): 75 TABLET, FILM COATED ORAL at 11:28
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
## 2019-03-26 ENCOUNTER — OUTPATIENT (OUTPATIENT)
Dept: OUTPATIENT SERVICES | Facility: HOSPITAL | Age: 84
LOS: 1 days | Discharge: HOME | End: 2019-03-26

## 2019-03-26 DIAGNOSIS — I10 ESSENTIAL (PRIMARY) HYPERTENSION: ICD-10-CM

## 2019-03-26 DIAGNOSIS — K22.70 BARRETT'S ESOPHAGUS WITHOUT DYSPLASIA: ICD-10-CM

## 2019-03-26 DIAGNOSIS — I51.9 HEART DISEASE, UNSPECIFIED: ICD-10-CM

## 2019-03-26 PROBLEM — M19.90 UNSPECIFIED OSTEOARTHRITIS, UNSPECIFIED SITE: Chronic | Status: ACTIVE | Noted: 2019-03-19

## 2019-03-26 PROBLEM — I25.10 ATHEROSCLEROTIC HEART DISEASE OF NATIVE CORONARY ARTERY WITHOUT ANGINA PECTORIS: Chronic | Status: ACTIVE | Noted: 2019-03-18

## 2019-04-01 DIAGNOSIS — M19.90 UNSPECIFIED OSTEOARTHRITIS, UNSPECIFIED SITE: ICD-10-CM

## 2019-04-01 DIAGNOSIS — R26.2 DIFFICULTY IN WALKING, NOT ELSEWHERE CLASSIFIED: ICD-10-CM

## 2019-04-01 DIAGNOSIS — E78.5 HYPERLIPIDEMIA, UNSPECIFIED: ICD-10-CM
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
## 2019-04-01 DIAGNOSIS — K21.9 GASTRO-ESOPHAGEAL REFLUX DISEASE WITHOUT ESOPHAGITIS: ICD-10-CM

## 2019-04-01 DIAGNOSIS — H81.10 BENIGN PAROXYSMAL VERTIGO, UNSPECIFIED EAR: ICD-10-CM

## 2019-04-01 DIAGNOSIS — S80.01XA CONTUSION OF RIGHT KNEE, INITIAL ENCOUNTER: ICD-10-CM

## 2019-04-01 DIAGNOSIS — R07.9 CHEST PAIN, UNSPECIFIED: ICD-10-CM

## 2019-04-01 DIAGNOSIS — I25.10 ATHEROSCLEROTIC HEART DISEASE OF NATIVE CORONARY ARTERY WITHOUT ANGINA PECTORIS: ICD-10-CM

## 2019-04-01 DIAGNOSIS — Z95.5 PRESENCE OF CORONARY ANGIOPLASTY IMPLANT AND GRAFT: ICD-10-CM

## 2019-04-01 DIAGNOSIS — M25.561 PAIN IN RIGHT KNEE: ICD-10-CM

## 2019-04-01 DIAGNOSIS — I10 ESSENTIAL (PRIMARY) HYPERTENSION: ICD-10-CM

## 2019-04-01 DIAGNOSIS — Z79.02 LONG TERM (CURRENT) USE OF ANTITHROMBOTICS/ANTIPLATELETS: ICD-10-CM
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
## 2019-04-01 DIAGNOSIS — K22.70 BARRETT'S ESOPHAGUS WITHOUT DYSPLASIA: ICD-10-CM

## 2019-04-01 DIAGNOSIS — F41.9 ANXIETY DISORDER, UNSPECIFIED: ICD-10-CM

## 2019-04-01 DIAGNOSIS — R19.7 DIARRHEA, UNSPECIFIED: ICD-10-CM

## 2020-03-24 NOTE — PHYSICAL THERAPY INITIAL EVALUATION ADULT - FOLLOWS COMMANDS/ANSWERS QUESTIONS, REHAB EVAL
I called [facility] 100% of the time

## 2021-01-12 ENCOUNTER — TRANSCRIPTION ENCOUNTER (OUTPATIENT)
Age: 86
End: 2021-01-12

## 2021-02-26 ENCOUNTER — TRANSCRIPTION ENCOUNTER (OUTPATIENT)
Age: 86
End: 2021-02-26

## 2021-09-01 ENCOUNTER — TRANSCRIPTION ENCOUNTER (OUTPATIENT)
Age: 86
End: 2021-09-01

## 2021-10-20 ENCOUNTER — INPATIENT (INPATIENT)
Facility: HOSPITAL | Age: 86
LOS: 5 days | Discharge: NURSING, REHAB OR LONG-TERM CARE | End: 2021-10-26
Attending: INTERNAL MEDICINE | Admitting: INTERNAL MEDICINE
Payer: MEDICARE

## 2021-10-20 VITALS
RESPIRATION RATE: 18 BRPM | DIASTOLIC BLOOD PRESSURE: 80 MMHG | SYSTOLIC BLOOD PRESSURE: 168 MMHG | HEART RATE: 77 BPM | TEMPERATURE: 97 F | OXYGEN SATURATION: 99 % | HEIGHT: 66 IN

## 2021-10-20 LAB
ALBUMIN SERPL ELPH-MCNC: 4.3 G/DL — SIGNIFICANT CHANGE UP (ref 3.5–5.2)
ALP SERPL-CCNC: 74 U/L — SIGNIFICANT CHANGE UP (ref 30–115)
ALT FLD-CCNC: 18 U/L — SIGNIFICANT CHANGE UP (ref 0–41)
ANION GAP SERPL CALC-SCNC: 15 MMOL/L — HIGH (ref 7–14)
APTT BLD: 32.4 SEC — SIGNIFICANT CHANGE UP (ref 27–39.2)
AST SERPL-CCNC: 23 U/L — SIGNIFICANT CHANGE UP (ref 0–41)
BASOPHILS # BLD AUTO: 0.05 K/UL — SIGNIFICANT CHANGE UP (ref 0–0.2)
BASOPHILS NFR BLD AUTO: 0.4 % — SIGNIFICANT CHANGE UP (ref 0–1)
BILIRUB SERPL-MCNC: 0.3 MG/DL — SIGNIFICANT CHANGE UP (ref 0.2–1.2)
BLD GP AB SCN SERPL QL: SIGNIFICANT CHANGE UP
BUN SERPL-MCNC: 15 MG/DL — SIGNIFICANT CHANGE UP (ref 10–20)
CALCIUM SERPL-MCNC: 9.8 MG/DL — SIGNIFICANT CHANGE UP (ref 8.5–10.1)
CHLORIDE SERPL-SCNC: 106 MMOL/L — SIGNIFICANT CHANGE UP (ref 98–110)
CO2 SERPL-SCNC: 18 MMOL/L — SIGNIFICANT CHANGE UP (ref 17–32)
CREAT SERPL-MCNC: 0.7 MG/DL — SIGNIFICANT CHANGE UP (ref 0.7–1.5)
EOSINOPHIL # BLD AUTO: 0 K/UL — SIGNIFICANT CHANGE UP (ref 0–0.7)
EOSINOPHIL NFR BLD AUTO: 0 % — SIGNIFICANT CHANGE UP (ref 0–8)
GLUCOSE SERPL-MCNC: 101 MG/DL — HIGH (ref 70–99)
HCT VFR BLD CALC: 43.6 % — SIGNIFICANT CHANGE UP (ref 37–47)
HGB BLD-MCNC: 14 G/DL — SIGNIFICANT CHANGE UP (ref 12–16)
IMM GRANULOCYTES NFR BLD AUTO: 0.6 % — HIGH (ref 0.1–0.3)
INR BLD: 1.04 RATIO — SIGNIFICANT CHANGE UP (ref 0.65–1.3)
LACTATE SERPL-SCNC: 1.2 MMOL/L — SIGNIFICANT CHANGE UP (ref 0.7–2)
LIDOCAIN IGE QN: 49 U/L — SIGNIFICANT CHANGE UP (ref 7–60)
LYMPHOCYTES # BLD AUTO: 1.46 K/UL — SIGNIFICANT CHANGE UP (ref 1.2–3.4)
LYMPHOCYTES # BLD AUTO: 12.2 % — LOW (ref 20.5–51.1)
MAGNESIUM SERPL-MCNC: 2.1 MG/DL — SIGNIFICANT CHANGE UP (ref 1.8–2.4)
MCHC RBC-ENTMCNC: 29 PG — SIGNIFICANT CHANGE UP (ref 27–31)
MCHC RBC-ENTMCNC: 32.1 G/DL — SIGNIFICANT CHANGE UP (ref 32–37)
MCV RBC AUTO: 90.5 FL — SIGNIFICANT CHANGE UP (ref 81–99)
MONOCYTES # BLD AUTO: 0.65 K/UL — HIGH (ref 0.1–0.6)
MONOCYTES NFR BLD AUTO: 5.4 % — SIGNIFICANT CHANGE UP (ref 1.7–9.3)
NEUTROPHILS # BLD AUTO: 9.78 K/UL — HIGH (ref 1.4–6.5)
NEUTROPHILS NFR BLD AUTO: 81.4 % — HIGH (ref 42.2–75.2)
NRBC # BLD: 0 /100 WBCS — SIGNIFICANT CHANGE UP (ref 0–0)
PLATELET # BLD AUTO: 293 K/UL — SIGNIFICANT CHANGE UP (ref 130–400)
POTASSIUM SERPL-MCNC: 3.9 MMOL/L — SIGNIFICANT CHANGE UP (ref 3.5–5)
POTASSIUM SERPL-SCNC: 3.9 MMOL/L — SIGNIFICANT CHANGE UP (ref 3.5–5)
PROT SERPL-MCNC: 6.9 G/DL — SIGNIFICANT CHANGE UP (ref 6–8)
PROTHROM AB SERPL-ACNC: 11.9 SEC — SIGNIFICANT CHANGE UP (ref 9.95–12.87)
RBC # BLD: 4.82 M/UL — SIGNIFICANT CHANGE UP (ref 4.2–5.4)
RBC # FLD: 13 % — SIGNIFICANT CHANGE UP (ref 11.5–14.5)
SARS-COV-2 RNA SPEC QL NAA+PROBE: SIGNIFICANT CHANGE UP
SODIUM SERPL-SCNC: 139 MMOL/L — SIGNIFICANT CHANGE UP (ref 135–146)
TROPONIN T SERPL-MCNC: <0.01 NG/ML — SIGNIFICANT CHANGE UP
WBC # BLD: 12.01 K/UL — HIGH (ref 4.8–10.8)
WBC # FLD AUTO: 12.01 K/UL — HIGH (ref 4.8–10.8)

## 2021-10-20 PROCEDURE — 93010 ELECTROCARDIOGRAM REPORT: CPT

## 2021-10-20 PROCEDURE — 99285 EMERGENCY DEPT VISIT HI MDM: CPT

## 2021-10-20 PROCEDURE — 73590 X-RAY EXAM OF LOWER LEG: CPT | Mod: 26,LT

## 2021-10-20 PROCEDURE — 73552 X-RAY EXAM OF FEMUR 2/>: CPT | Mod: 26,LT

## 2021-10-20 PROCEDURE — 73564 X-RAY EXAM KNEE 4 OR MORE: CPT | Mod: 26,LT

## 2021-10-20 PROCEDURE — 73630 X-RAY EXAM OF FOOT: CPT | Mod: 26,LT

## 2021-10-20 PROCEDURE — 73610 X-RAY EXAM OF ANKLE: CPT | Mod: 26,LT

## 2021-10-20 PROCEDURE — 73502 X-RAY EXAM HIP UNI 2-3 VIEWS: CPT | Mod: 26,LT

## 2021-10-20 PROCEDURE — 71045 X-RAY EXAM CHEST 1 VIEW: CPT | Mod: 26

## 2021-10-20 PROCEDURE — 72192 CT PELVIS W/O DYE: CPT | Mod: 26,MA

## 2021-10-20 RX ORDER — MORPHINE SULFATE 50 MG/1
2 CAPSULE, EXTENDED RELEASE ORAL ONCE
Refills: 0 | Status: DISCONTINUED | OUTPATIENT
Start: 2021-10-20 | End: 2021-10-20

## 2021-10-20 RX ORDER — ONDANSETRON 8 MG/1
4 TABLET, FILM COATED ORAL ONCE
Refills: 0 | Status: COMPLETED | OUTPATIENT
Start: 2021-10-20 | End: 2021-10-20

## 2021-10-20 RX ORDER — MORPHINE SULFATE 50 MG/1
4 CAPSULE, EXTENDED RELEASE ORAL ONCE
Refills: 0 | Status: DISCONTINUED | OUTPATIENT
Start: 2021-10-20 | End: 2021-10-20

## 2021-10-20 RX ADMIN — ONDANSETRON 4 MILLIGRAM(S): 8 TABLET, FILM COATED ORAL at 20:29

## 2021-10-20 RX ADMIN — MORPHINE SULFATE 2 MILLIGRAM(S): 50 CAPSULE, EXTENDED RELEASE ORAL at 22:18

## 2021-10-20 RX ADMIN — MORPHINE SULFATE 4 MILLIGRAM(S): 50 CAPSULE, EXTENDED RELEASE ORAL at 20:39

## 2021-10-20 RX ADMIN — MORPHINE SULFATE 2 MILLIGRAM(S): 50 CAPSULE, EXTENDED RELEASE ORAL at 19:21
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
## 2021-10-20 NOTE — ED ADULT NURSE REASSESSMENT NOTE - NS ED NURSE REASSESS COMMENT FT1
Patient transferred to [facility] in NAD. Report endorsed to receiving RN at bedside.
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
## 2021-10-20 NOTE — ED PROVIDER NOTE - NS ED ROS FT
Review of Systems:  CONSTITUTIONAL: No fever, No diaphoresis  SKIN: No rash  HEMATOLOGIC: No abnormal bleeding or bruising  EYES: No eye pain, No blurred vision  ENT: No change in hearing, No sore throat, No neck pain, No rhinorrhea  RESPIRATORY: No shortness of breath, No cough  CARDIAC: No chest pain, No palpitations  GI: No abdominal pain, No nausea, No vomiting, No diarrhea  MUSCULOSKELETAL: + joint paint, + swelling, No back pain  NEUROLOGIC: No numbness, No focal weakness, No headache, No dizziness  All other systems negative, unless specified in HPI

## 2021-10-20 NOTE — ED ADULT NURSE REASSESSMENT NOTE - NS ED NURSE REASSESS COMMENT FT1
Patient received change of shift in NAD, pt a+ox3 denies any complaints at this time. Daughter at bedside. Pending results and dispo.

## 2021-10-21 LAB
ALBUMIN SERPL ELPH-MCNC: 4.3 G/DL — SIGNIFICANT CHANGE UP (ref 3.5–5.2)
ALP SERPL-CCNC: 118 U/L — HIGH (ref 30–115)
ALT FLD-CCNC: 476 U/L — HIGH (ref 0–41)
ANION GAP SERPL CALC-SCNC: 17 MMOL/L — HIGH (ref 7–14)
AST SERPL-CCNC: 751 U/L — HIGH (ref 0–41)
BASOPHILS # BLD AUTO: 0.04 K/UL — SIGNIFICANT CHANGE UP (ref 0–0.2)
BASOPHILS NFR BLD AUTO: 0.4 % — SIGNIFICANT CHANGE UP (ref 0–1)
BILIRUB SERPL-MCNC: 1.1 MG/DL — SIGNIFICANT CHANGE UP (ref 0.2–1.2)
BLD GP AB SCN SERPL QL: SIGNIFICANT CHANGE UP
BUN SERPL-MCNC: 14 MG/DL — SIGNIFICANT CHANGE UP (ref 10–20)
CALCIUM SERPL-MCNC: 9.6 MG/DL — SIGNIFICANT CHANGE UP (ref 8.5–10.1)
CHLORIDE SERPL-SCNC: 104 MMOL/L — SIGNIFICANT CHANGE UP (ref 98–110)
CO2 SERPL-SCNC: 19 MMOL/L — SIGNIFICANT CHANGE UP (ref 17–32)
CREAT SERPL-MCNC: 0.7 MG/DL — SIGNIFICANT CHANGE UP (ref 0.7–1.5)
EOSINOPHIL # BLD AUTO: 0 K/UL — SIGNIFICANT CHANGE UP (ref 0–0.7)
EOSINOPHIL NFR BLD AUTO: 0 % — SIGNIFICANT CHANGE UP (ref 0–8)
GLUCOSE SERPL-MCNC: 115 MG/DL — HIGH (ref 70–99)
HCT VFR BLD CALC: 42.2 % — SIGNIFICANT CHANGE UP (ref 37–47)
HGB BLD-MCNC: 13.5 G/DL — SIGNIFICANT CHANGE UP (ref 12–16)
IMM GRANULOCYTES NFR BLD AUTO: 0.2 % — SIGNIFICANT CHANGE UP (ref 0.1–0.3)
LYMPHOCYTES # BLD AUTO: 1.23 K/UL — SIGNIFICANT CHANGE UP (ref 1.2–3.4)
LYMPHOCYTES # BLD AUTO: 12 % — LOW (ref 20.5–51.1)
MAGNESIUM SERPL-MCNC: 2.2 MG/DL — SIGNIFICANT CHANGE UP (ref 1.8–2.4)
MCHC RBC-ENTMCNC: 29.1 PG — SIGNIFICANT CHANGE UP (ref 27–31)
MCHC RBC-ENTMCNC: 32 G/DL — SIGNIFICANT CHANGE UP (ref 32–37)
MCV RBC AUTO: 90.9 FL — SIGNIFICANT CHANGE UP (ref 81–99)
MONOCYTES # BLD AUTO: 0.85 K/UL — HIGH (ref 0.1–0.6)
MONOCYTES NFR BLD AUTO: 8.3 % — SIGNIFICANT CHANGE UP (ref 1.7–9.3)
NEUTROPHILS # BLD AUTO: 8.11 K/UL — HIGH (ref 1.4–6.5)
NEUTROPHILS NFR BLD AUTO: 79.1 % — HIGH (ref 42.2–75.2)
NRBC # BLD: 0 /100 WBCS — SIGNIFICANT CHANGE UP (ref 0–0)
PHOSPHATE SERPL-MCNC: 3.9 MG/DL — SIGNIFICANT CHANGE UP (ref 2.1–4.9)
PLATELET # BLD AUTO: 268 K/UL — SIGNIFICANT CHANGE UP (ref 130–400)
POTASSIUM SERPL-MCNC: 4.3 MMOL/L — SIGNIFICANT CHANGE UP (ref 3.5–5)
POTASSIUM SERPL-SCNC: 4.3 MMOL/L — SIGNIFICANT CHANGE UP (ref 3.5–5)
PROT SERPL-MCNC: 6.9 G/DL — SIGNIFICANT CHANGE UP (ref 6–8)
RBC # BLD: 4.64 M/UL — SIGNIFICANT CHANGE UP (ref 4.2–5.4)
RBC # FLD: 13.2 % — SIGNIFICANT CHANGE UP (ref 11.5–14.5)
SODIUM SERPL-SCNC: 140 MMOL/L — SIGNIFICANT CHANGE UP (ref 135–146)
WBC # BLD: 10.25 K/UL — SIGNIFICANT CHANGE UP (ref 4.8–10.8)
WBC # FLD AUTO: 10.25 K/UL — SIGNIFICANT CHANGE UP (ref 4.8–10.8)

## 2021-10-21 PROCEDURE — 99221 1ST HOSP IP/OBS SF/LOW 40: CPT

## 2021-10-21 RX ORDER — ACETAMINOPHEN 500 MG
650 TABLET ORAL EVERY 6 HOURS
Refills: 0 | Status: DISCONTINUED | OUTPATIENT
Start: 2021-10-21 | End: 2021-10-22

## 2021-10-21 RX ORDER — OXYCODONE AND ACETAMINOPHEN 5; 325 MG/1; MG/1
2 TABLET ORAL EVERY 6 HOURS
Refills: 0 | Status: DISCONTINUED | OUTPATIENT
Start: 2021-10-21 | End: 2021-10-21

## 2021-10-21 RX ORDER — LANOLIN ALCOHOL/MO/W.PET/CERES
5 CREAM (GRAM) TOPICAL AT BEDTIME
Refills: 0 | Status: DISCONTINUED | OUTPATIENT
Start: 2021-10-21 | End: 2021-10-22

## 2021-10-21 RX ORDER — MORPHINE SULFATE 50 MG/1
2 CAPSULE, EXTENDED RELEASE ORAL
Refills: 0 | Status: DISCONTINUED | OUTPATIENT
Start: 2021-10-21 | End: 2021-10-22

## 2021-10-21 RX ORDER — METOPROLOL TARTRATE 50 MG
25 TABLET ORAL
Refills: 0 | Status: DISCONTINUED | OUTPATIENT
Start: 2021-10-21 | End: 2021-10-22

## 2021-10-21 RX ORDER — SENNA PLUS 8.6 MG/1
2 TABLET ORAL AT BEDTIME
Refills: 0 | Status: DISCONTINUED | OUTPATIENT
Start: 2021-10-21 | End: 2021-10-22

## 2021-10-21 RX ORDER — PANTOPRAZOLE SODIUM 20 MG/1
40 TABLET, DELAYED RELEASE ORAL
Refills: 0 | Status: DISCONTINUED | OUTPATIENT
Start: 2021-10-21 | End: 2021-10-22

## 2021-10-21 RX ORDER — MORPHINE SULFATE 50 MG/1
4 CAPSULE, EXTENDED RELEASE ORAL EVERY 8 HOURS
Refills: 0 | Status: DISCONTINUED | OUTPATIENT
Start: 2021-10-21 | End: 2021-10-21

## 2021-10-21 RX ORDER — ENOXAPARIN SODIUM 100 MG/ML
40 INJECTION SUBCUTANEOUS ONCE
Refills: 0 | Status: COMPLETED | OUTPATIENT
Start: 2021-10-21 | End: 2021-10-21

## 2021-10-21 RX ORDER — ATORVASTATIN CALCIUM 80 MG/1
40 TABLET, FILM COATED ORAL AT BEDTIME
Refills: 0 | Status: DISCONTINUED | OUTPATIENT
Start: 2021-10-21 | End: 2021-10-22

## 2021-10-21 RX ORDER — SODIUM CHLORIDE 9 MG/ML
1000 INJECTION INTRAMUSCULAR; INTRAVENOUS; SUBCUTANEOUS
Refills: 0 | Status: DISCONTINUED | OUTPATIENT
Start: 2021-10-21 | End: 2021-10-22

## 2021-10-21 RX ORDER — ENOXAPARIN SODIUM 100 MG/ML
40 INJECTION SUBCUTANEOUS DAILY
Refills: 0 | Status: DISCONTINUED | OUTPATIENT
Start: 2021-10-21 | End: 2021-10-22

## 2021-10-21 RX ADMIN — MORPHINE SULFATE 2 MILLIGRAM(S): 50 CAPSULE, EXTENDED RELEASE ORAL at 01:04

## 2021-10-21 RX ADMIN — Medication 650 MILLIGRAM(S): at 06:22

## 2021-10-21 RX ADMIN — Medication 25 MILLIGRAM(S): at 17:33

## 2021-10-21 RX ADMIN — OXYCODONE AND ACETAMINOPHEN 2 TABLET(S): 5; 325 TABLET ORAL at 09:53

## 2021-10-21 RX ADMIN — ENOXAPARIN SODIUM 40 MILLIGRAM(S): 100 INJECTION SUBCUTANEOUS at 12:17

## 2021-10-21 RX ADMIN — ENOXAPARIN SODIUM 40 MILLIGRAM(S): 100 INJECTION SUBCUTANEOUS at 01:44

## 2021-10-21 RX ADMIN — ATORVASTATIN CALCIUM 40 MILLIGRAM(S): 80 TABLET, FILM COATED ORAL at 21:41

## 2021-10-21 RX ADMIN — Medication 5 MILLIGRAM(S): at 01:44

## 2021-10-21 RX ADMIN — PANTOPRAZOLE SODIUM 40 MILLIGRAM(S): 20 TABLET, DELAYED RELEASE ORAL at 06:22

## 2021-10-21 RX ADMIN — MORPHINE SULFATE 4 MILLIGRAM(S): 50 CAPSULE, EXTENDED RELEASE ORAL at 11:19

## 2021-10-21 RX ADMIN — MORPHINE SULFATE 2 MILLIGRAM(S): 50 CAPSULE, EXTENDED RELEASE ORAL at 17:33

## 2021-10-21 RX ADMIN — MORPHINE SULFATE 2 MILLIGRAM(S): 50 CAPSULE, EXTENDED RELEASE ORAL at 14:23

## 2021-10-21 RX ADMIN — MORPHINE SULFATE 2 MILLIGRAM(S): 50 CAPSULE, EXTENDED RELEASE ORAL at 20:15

## 2021-10-21 RX ADMIN — MORPHINE SULFATE 4 MILLIGRAM(S): 50 CAPSULE, EXTENDED RELEASE ORAL at 01:36

## 2021-10-21 RX ADMIN — MORPHINE SULFATE 4 MILLIGRAM(S): 50 CAPSULE, EXTENDED RELEASE ORAL at 01:04

## 2021-10-21 RX ADMIN — SODIUM CHLORIDE 75 MILLILITER(S): 9 INJECTION INTRAMUSCULAR; INTRAVENOUS; SUBCUTANEOUS at 11:07

## 2021-10-21 RX ADMIN — Medication 25 MILLIGRAM(S): at 06:22

## 2021-10-21 RX ADMIN — Medication 5 MILLIGRAM(S): at 21:41

## 2021-10-21 RX ADMIN — SODIUM CHLORIDE 75 MILLILITER(S): 9 INJECTION INTRAMUSCULAR; INTRAVENOUS; SUBCUTANEOUS at 12:19

## 2021-10-21 RX ADMIN — OXYCODONE AND ACETAMINOPHEN 2 TABLET(S): 5; 325 TABLET ORAL at 10:30

## 2021-10-21 RX ADMIN — MORPHINE SULFATE 4 MILLIGRAM(S): 50 CAPSULE, EXTENDED RELEASE ORAL at 05:58

## 2021-10-21 NOTE — H&P ADULT - NSHPPHYSICALEXAM_GEN_ALL_CORE
CONSTITUTIONAL: AAOx3  HEAD: Atraumatic, normocephalic  EYES: EOM intact, PERRLA, conjunctiva and sclera clear  ENT: Supple, no masses, no thyromegaly, no bruits, no JVD; moist mucous membranes  PULMONARY: Dec BS  CARDIOVASCULAR: Regular rate and rhythm  GASTROINTESTINAL: Soft, non-tender, non-distended; bowel sounds present  MUSCULOSKELETAL: Short and externally rotated LLE  NEUROLOGY: non-focal  SKIN: No rashes or lesions; warm and dry CONSTITUTIONAL: AAOx3  HEAD: Atraumatic, normocephalic  EYES: EOM intact, PERRLA, conjunctiva and sclera clear  ENT: moist mucous membranes  PULMONARY: Dec BS  CARDIOVASCULAR: Regular rate and rhythm  GASTROINTESTINAL: Soft, non-tender, non-distended; bowel sounds present  MUSCULOSKELETAL: Short and externally rotated LLE  NEUROLOGY: non-focal  SKIN: No rashes or lesions; warm and dry

## 2021-10-21 NOTE — H&P ADULT - ATTENDING COMMENTS
HPI:  86 YO F with PMHx of yolette esophagus, CAD s/p 2 stents in 2010 (on plavix), BPPV presented to the ED with mechanical fall.  Collateral history obtained from son at bedside. Patient states that she attempted to turn and fell on L hip onto the concrete. Denies any head injury or LOC. Not on any anticoagulation.  Patient denies fevers, chills, headache, cough, shortness of breath, chest pain, palpitations, abdominal pain, nausea, vomiting, diarrhea, constipation, melena, hematochezia, dysuria, urinary frequency or urgency, numbness, tingling, recent travel or any known sick contact.   In the ED VS were unremarkable. Trauma workup showed L femoral neck fracture  (21 Oct 2021 00:37)    REVIEW OF SYSTEMS: see cc/HPI   CONSTITUTIONAL: No weakness, fevers or chills  EYES/ENT: No visual changes;  No vertigo or throat pain   NECK: No pain or stiffness  RESPIRATORY: No cough, wheezing, hemoptysis; No shortness of breath  CARDIOVASCULAR: No chest pain or palpitations  GASTROINTESTINAL: No abdominal or epigastric pain. No nausea, vomiting, or hematemesis; No diarrhea or constipation. No melena or hematochezia.  GENITOURINARY: No dysuria, frequency or hematuria  NEUROLOGICAL: No numbness or weakness  SKIN: No itching, rashes    Physical Exam:  General: WN/WD NAD  Neurology: A&Ox3, nonfocal, follows commands  Eyes: PERRLA/ EOMI  ENT/Neck: Neck supple, trachea midline, No JVD  Respiratory: CTA B/L, No wheezing, rales, rhonchi  CV: Normal rate regular rhythm, S1S2, no murmurs, rubs or gallops  Abdominal: Soft, NT, ND +BS,   Extremities: No edema, + peripheral pulses  Skin: No Rashes, Hematoma, Ecchymosis  Incisions: n/a  Tubes: n/a    A/p  L femoral neck fracture   CAD   DVT prophylaxis HPI:  88 YO F with PMHx of yolette esophagus, CAD s/p 2 stents in 2010 (on plavix), BPPV presented to the ED with mechanical fall.  Collateral history obtained from son at bedside. Patient states that she attempted to turn and fell on L hip onto the concrete. Denies any head injury or LOC. Not on any anticoagulation.  Patient denies fevers, chills, headache, cough, shortness of breath, chest pain, palpitations, abdominal pain, nausea, vomiting, diarrhea, constipation, melena, hematochezia, dysuria, urinary frequency or urgency, numbness, tingling, recent travel or any known sick contact.   In the ED VS were unremarkable. Trauma workup showed L femoral neck fracture  (21 Oct 2021 00:37)    REVIEW OF SYSTEMS: see cc/HPI   CONSTITUTIONAL: No weakness, fevers or chills  EYES/ENT: No visual changes;  No vertigo or throat pain   NECK: No pain or stiffness  RESPIRATORY: No cough, wheezing, hemoptysis; No shortness of breath  CARDIOVASCULAR: No chest pain or palpitations  GASTROINTESTINAL: No abdominal or epigastric pain. No nausea, vomiting, or hematemesis; No diarrhea or constipation. No melena or hematochezia.  GENITOURINARY: No dysuria, frequency or hematuria  NEUROLOGICAL: No numbness or weakness  SKIN: No itching, rashes    Physical Exam:  General: WN/WD NAD  Neurology: A&Ox3, nonfocal, follows commands  Eyes: PERRLA/ EOMI  ENT/Neck: Neck supple, trachea midline, No JVD  Respiratory: CTA B/L, No wheezing, rales, rhonchi  CV: Normal rate regular rhythm, S1S2, no murmurs, rubs or gallops  Abdominal: Soft, NT, ND +BS,   Extremities: No edema, + peripheral pulses, (+) LLE shortened and externally rotated, (+) hip pain w/ movement   Skin: No Rashes, Hematoma, Ecchymosis  Incisions: n/a  Tubes: n/a    A/p  L femoral neck fracture / Mechanical fall   -NWB and PRN pain Rx  -Ortho eval   -will need PT/ Rehab     CAD w/ h/o PCI and stenting (no recent ischemic w/u or Cardio follow up)  -2D echo   -Cardiology eval for pre-op risk stratification     DVT prophylaxis

## 2021-10-21 NOTE — H&P ADULT - NSHPLABSRESULTS_GEN_ALL_CORE
VITAL SIGNS: Last 24 Hours  T(C): 36.5 (21 Oct 2021 00:22), Max: 36.5 (21 Oct 2021 00:22)  T(F): 97.7 (21 Oct 2021 00:22), Max: 97.7 (21 Oct 2021 00:22)  HR: 92 (21 Oct 2021 00:22) (77 - 92)  BP: 153/66 (21 Oct 2021 00:22) (126/77 - 168/80)  BP(mean): --  RR: 18 (21 Oct 2021 00:22) (18 - 18)  SpO2: 95% (21 Oct 2021 00:22) (95% - 99%)    LABS:                        14.0   12.01 )-----------( 293      ( 20 Oct 2021 19:50 )             43.6     10-20    139  |  106  |  15  ----------------------------<  101<H>  3.9   |  18  |  0.7    Ca    9.8      20 Oct 2021 19:50  Mg     2.1     10-20    TPro  6.9  /  Alb  4.3  /  TBili  0.3  /  DBili  x   /  AST  23  /  ALT  18  /  AlkPhos  74  10-20    PT/INR - ( 20 Oct 2021 21:51 )   PT: 11.90 sec;   INR: 1.04 ratio         PTT - ( 20 Oct 2021 21:51 )  PTT:32.4 sec      Lactate, Blood: 1.2 mmol/L (10-20-21 @ 19:50)  Troponin T, Serum: <0.01 ng/mL (10-20-21 @ 19:50)      CARDIAC MARKERS ( 20 Oct 2021 19:50 )  x     / <0.01 ng/mL / x     / x     / x          RADIOLOGY:    < from: Xray Hip 2-3 Views, Left (10.20.21 @ 21:31) >    Impression:    Left femoral neck fracture, suboptimally evaluated.    < end of copied text >    < from: CT Knee No Cont, Right (03.19.19 @ 02:00) >    IMPRESSION:    No acute osseous abnormality.    Moderate to severe degenerative changes of the right knee joint, greatest   at the lateral compartment.    < end of copied text >

## 2021-10-21 NOTE — H&P ADULT - NSICDXPASTMEDICALHX_GEN_ALL_CORE_FT
PAST MEDICAL HISTORY:  Arthritis     Barretts esophagus     CAD (coronary artery disease)     HTN (hypertension)

## 2021-10-21 NOTE — PRE PROCEDURE NOTE - PRE PROCEDURE EVALUATION
ORTHOPEDIC SURGERY PRE OP NOTE      Diagnosis: LEFT FEMORAL NECK FRACTURE    Planned Procedure: LEFT HIP HEMIARTHROPLASTY VERSUS TOTAL HIP ARTHROPLASTY    Consent: TO BE OBTAINED BY ATTENDING                   Risks/benefits/alternatives were discussed with the patient/family and they wish to proceed with surgery.       ANTICIPATED DATE OF PROCEDURE: 10/21/21  SCHEDULED CASE OR ADD-ON CASE: ADD ON      Consent:     Clearances:   [ ] Medicine   [ ] Cardiac    T(C): 36.5 (10-21-21 @ 00:22), Max: 36.5 (10-21-21 @ 00:22)  HR: 92 (10-21-21 @ 00:22) (77 - 92)  BP: 153/66 (10-21-21 @ 00:22) (126/77 - 168/80)  RR: 18 (10-21-21 @ 00:22) (18 - 18)  SpO2: 95% (10-21-21 @ 00:22) (95% - 99%)    Labs:                        14.0   12.01 )-----------( 293      ( 20 Oct 2021 19:50 )             43.6     10-20    139  |  106  |  15  ----------------------------<  101<H>  3.9   |  18  |  0.7    Ca    9.8      20 Oct 2021 19:50  Mg     2.1     10-20    TPro  6.9  /  Alb  4.3  /  TBili  0.3  /  DBili  x   /  AST  23  /  ALT  18  /  AlkPhos  74  10-20    PT/INR - ( 20 Oct 2021 21:51 )   PT: 11.90 sec;   INR: 1.04 ratio         PTT - ( 20 Oct 2021 21:51 )  PTT:32.4 sec  Type and Screen X 2:    COVID-19 PCR: NotDetec (20 Oct 2021 19:03)    [X]EKG:   [X]CXR:       DIET: NPO after midnight  IVF: per primary team      ANTICOAGULATION STATUS ( include name of anticoagulant): Plavix   [ ] Please hold Plavix until post-op                                         A/P: Patient is a 87y y/o Female with a L femoral neck fracture pending L hip hemiarthroplasty versus total hip arthroplasty with orthopaedics later today versus tomorrow pending medicine/cardiac clearances.     [ ] NPO and IVF   [ ]pain control/analgesia prn per primary team   [ ]Incentive Spirometry   [ ]F/U Clearance  [ ]F/U Pending Labs  [ ]Notify Ortho with any questions- spectra [id number]    [ ]DISCUSSED WITH PRIMARY TEAM MEMBER (name of team member): Dr. Campos   [ ]Date and Time DISCUSSED WITH PRIMARY TEAM MEMBER: 10/20/21, 23:45 ORTHOPEDIC SURGERY PRE OP NOTE      Diagnosis: LEFT FEMORAL NECK FRACTURE    Planned Procedure: LEFT HIP HEMIARTHROPLASTY VERSUS TOTAL HIP ARTHROPLASTY    Consent: TO BE OBTAINED BY ATTENDING                   Risks/benefits/alternatives were discussed with the patient/family and they wish to proceed with surgery.       ANTICIPATED DATE OF PROCEDURE: 10/22/21  SCHEDULED CASE OR ADD-ON CASE: booked       Consent: pt has capacity to sign her own consent     Clearances:   [ ] Medicine    [ ] Cardiac    T(C): 36.5 (10-21-21 @ 00:22), Max: 36.5 (10-21-21 @ 00:22)  HR: 92 (10-21-21 @ 00:22) (77 - 92)  BP: 153/66 (10-21-21 @ 00:22) (126/77 - 168/80)  RR: 18 (10-21-21 @ 00:22) (18 - 18)  SpO2: 95% (10-21-21 @ 00:22) (95% - 99%)    Labs:                        14.0   12.01 )-----------( 293      ( 20 Oct 2021 19:50 )             43.6     10-20    139  |  106  |  15  ----------------------------<  101<H>  3.9   |  18  |  0.7    Ca    9.8      20 Oct 2021 19:50  Mg     2.1     10-20    TPro  6.9  /  Alb  4.3  /  TBili  0.3  /  DBili  x   /  AST  23  /  ALT  18  /  AlkPhos  74  10-20    PT/INR - ( 20 Oct 2021 21:51 )   PT: 11.90 sec;   INR: 1.04 ratio         PTT - ( 20 Oct 2021 21:51 )  PTT:32.4 sec  Type and Screen X 2:    COVID-19 PCR: NotDetec (20 Oct 2021 19:03)    [X]EKG:   [X]CXR:       DIET: NPO after midnight  IVF: per primary team      ANTICOAGULATION STATUS ( include name of anticoagulant): Plavix   [ ] Please hold Plavix until post-op                                         A/P: Patient is a 87y y/o Female with a L femoral neck fracture pending L hip hemiarthroplasty versus total hip arthroplasty sceduled for 10/22    [ ] NPO and IVF   [ ]pain control/analgesia prn per primary team   [ ]Incentive Spirometry   [ ]F/U Clearance  [ ]F/U Pending Labs  [ ]Notify Ortho with any questions- spectra [id number]    [ ]DISCUSSED WITH PRIMARY TEAM MEMBER (name of team member): Dr. adams [id number]  [ ]Date and Time DISCUSSED WITH PRIMARY TEAM MEMBER: 10/21/21,noon ORTHOPEDIC SURGERY PRE OP NOTE      Diagnosis: LEFT FEMORAL NECK FRACTURE    Planned Procedure: LEFT HIP HEMIARTHROPLASTY VERSUS TOTAL HIP ARTHROPLASTY    Consent: TO BE OBTAINED BY ATTENDING                   Risks/benefits/alternatives were discussed with the patient/family and they wish to proceed with surgery.       ANTICIPATED DATE OF PROCEDURE: 10/22/21  SCHEDULED CASE OR ADD-ON CASE: booked       Consent: pt has capacity to sign her own consent     Clearances:   [X] Cardiac-Patient may proceed with the planned surgery at moderate risk    T(C): 36.5 (10-21-21 @ 00:22), Max: 36.5 (10-21-21 @ 00:22)  HR: 92 (10-21-21 @ 00:22) (77 - 92)  BP: 153/66 (10-21-21 @ 00:22) (126/77 - 168/80)  RR: 18 (10-21-21 @ 00:22) (18 - 18)  SpO2: 95% (10-21-21 @ 00:22) (95% - 99%)    Labs:                        14.0   12.01 )-----------( 293      ( 20 Oct 2021 19:50 )             43.6     10-20    139  |  106  |  15  ----------------------------<  101<H>  3.9   |  18  |  0.7    Ca    9.8      20 Oct 2021 19:50  Mg     2.1     10-20    TPro  6.9  /  Alb  4.3  /  TBili  0.3  /  DBili  x   /  AST  23  /  ALT  18  /  AlkPhos  74  10-20    PT/INR - ( 20 Oct 2021 21:51 )   PT: 11.90 sec;   INR: 1.04 ratio         PTT - ( 20 Oct 2021 21:51 )  PTT:32.4 sec  Type and Screen X 2: O POSITIVE     COVID-19 PCR: NotDetec (20 Oct 2021 19:03)    [X]EKG: 10/20/21  [X]CXR: 10/20/2021      DIET: NPO after midnight  IVF: per primary team      ANTICOAGULATION STATUS ( include name of anticoagulant): Plavix   [ ] Please hold Plavix until post-op                                         A/P: Patient is a 87y y/o Female with a L femoral neck fracture pending L hip hemiarthroplasty versus total hip arthroplasty sceduled for 10/22    [ ] NPO and IVF   [ ]pain control/analgesia prn per primary team   [ ]Incentive Spirometry   [ ]Cleared   [ ]Notify Ortho with any questions- spectra [id number]    [ ]DISCUSSED WITH PRIMARY TEAM MEMBER (name of team member): Dr. adams [id number]  [ ]Date and Time DISCUSSED WITH PRIMARY TEAM MEMBER: 10/21/21,noon

## 2021-10-21 NOTE — CONSULT NOTE ADULT - SUBJECTIVE AND OBJECTIVE BOX
Patient is a 87y old  Female who presents with a chief complaint of Fall (21 Oct 2021 12:21)      REASON FOR CONSULT     HPI:  86 YO F with PMHx of yolette esophagus, CAD s/p 2 stents in 2010 (on plavix), BPPV presented to the ED with mechanical fall.  Collateral history obtained from son at bedside. Patient states that she attempted to turn and fell on L hip onto the concrete. Denies any head injury or LOC. Not on any anticoagulation.  Patient denies fevers, chills, headache, cough, shortness of breath, chest pain, palpitations, abdominal pain, nausea, vomiting, diarrhea, constipation, melena, hematochezia, dysuria, urinary frequency or urgency, numbness, tingling, recent travel or any known sick contact.   In the ED VS were unremarkable. Trauma workup showed L femoral neck fracture  (21 Oct 2021 00:37)      PAST MEDICAL & SURGICAL HISTORY:  CAD (coronary artery disease)    HTN (hypertension)    Arthritis    Barretts esophagus    No significant past surgical history            SOCIAL HISTORY:     FAMILY HISTORY:    penicillin (Hives)      MEDICATIONS  (STANDING):  atorvastatin 40 milliGRAM(s) Oral at bedtime  enoxaparin Injectable 40 milliGRAM(s) SubCutaneous daily  melatonin 5 milliGRAM(s) Oral at bedtime  metoprolol tartrate 25 milliGRAM(s) Oral two times a day  pantoprazole    Tablet 40 milliGRAM(s) Oral before breakfast  senna 2 Tablet(s) Oral at bedtime  sodium chloride 0.9%. 1000 milliLiter(s) (75 mL/Hr) IV Continuous <Continuous>    MEDICATIONS  (PRN):  acetaminophen   Tablet .. 650 milliGRAM(s) Oral every 6 hours PRN Mild Pain (1 - 3)  morphine  - Injectable 2 milliGRAM(s) IV Push every 3 hours PRN Moderate Pain (4 - 6)      Vital Signs Last 24 Hrs  T(C): 36.7 (21 Oct 2021 17:16), Max: 36.7 (21 Oct 2021 16:34)  T(F): 98.1 (21 Oct 2021 16:34), Max: 98.1 (21 Oct 2021 16:34)  HR: 87 (21 Oct 2021 17:16) (80 - 92)  BP: 131/61 (21 Oct 2021 17:16) (126/77 - 153/66)  BP(mean): --  RR: 18 (21 Oct 2021 17:16) (18 - 18)  SpO2: 95% (21 Oct 2021 17:16) (95% - 99%) I&O's Detail    PHYSICAL EXAM:          REVIEW OF SYSTEMS            ECG:  ECHOCARDIOGRAM:    RADIOLOGY & ADDITIONAL STUDIES:  < from: CT Pelvis Bony Only No Cont (10.20.21 @ 21:02) >  Findings:    There is a partially included tip of the liver which appears to be elongated. No evidence of bowel obstruction. Scattered colonic diverticula are noted. No evidence of pelvic sidewall hematoma or lymphadenopathy.    There is a left femoral neck fracture (predominantly midcervical) with anterior angulation. Left hip lipohemarthrosis. No other fractures are identified. There is diffuse osteopenia. There are mild bilateral acetabular bony productive changes. Lower lumbar and bilateral sacroiliac degenerative changes are noted as well. Fatty atrophy of the left gluteus medius muscle laterally. Bilateral fatty atrophy of bilateral gluteus minimus muscles.    Impression:    Left femoral neck fracture as above.      < end of copied text >      LABS:                        13.5   10.25 )-----------( 268      ( 21 Oct 2021 06:54 )             42.2     10-21    140  |  104  |  14  ----------------------------<  115<H>  4.3   |  19  |  0.7    Ca    9.6      21 Oct 2021 06:54  Phos  3.9     10-21  Mg     2.2     10-21    TPro  6.9  /  Alb  4.3  /  TBili  1.1  /  DBili  x   /  AST  751<H>  /  ALT  476<H>  /  AlkPhos  118<H>  10-21    CARDIAC MARKERS ( 20 Oct 2021 19:50 )  x     / <0.01 ng/mL / x     / x     / x          PT/INR - ( 20 Oct 2021 21:51 )   PT: 11.90 sec;   INR: 1.04 ratio         PTT - ( 20 Oct 2021 21:51 )  PTT:32.4 sec  I&O's Summary       Patient was seen and examined by me on    Patient is a 87y old  Female who presents with a chief complaint of Fall (21 Oct 2021 12:21)      REASON FOR CONSULT     HPI:  86 YO F with PMHx of yolette esophagus, CAD s/p 2 stents in 2010 (on plavix), BPPV presented to the ED with mechanical fall.  Collateral history obtained from son at bedside. Patient states that she attempted to turn and fell on L hip onto the concrete. Denies any head injury or LOC. Not on any anticoagulation.  Patient denies fevers, chills, headache, cough, shortness of breath, chest pain, palpitations, abdominal pain, nausea, vomiting, diarrhea, constipation, melena, hematochezia, dysuria, urinary frequency or urgency, numbness, tingling, recent travel or any known sick contact.   In the ED VS were unremarkable. Trauma workup showed L femoral neck fracture  (21 Oct 2021 00:37)      PAST MEDICAL & SURGICAL HISTORY:  CAD (coronary artery disease)    HTN (hypertension)    Arthritis    Barretts esophagus    No significant past surgical history            SOCIAL HISTORY:     FAMILY HISTORY:    penicillin (Hives)      MEDICATIONS  (STANDING):  atorvastatin 40 milliGRAM(s) Oral at bedtime  enoxaparin Injectable 40 milliGRAM(s) SubCutaneous daily  melatonin 5 milliGRAM(s) Oral at bedtime  metoprolol tartrate 25 milliGRAM(s) Oral two times a day  pantoprazole    Tablet 40 milliGRAM(s) Oral before breakfast  senna 2 Tablet(s) Oral at bedtime  sodium chloride 0.9%. 1000 milliLiter(s) (75 mL/Hr) IV Continuous <Continuous>    MEDICATIONS  (PRN):  acetaminophen   Tablet .. 650 milliGRAM(s) Oral every 6 hours PRN Mild Pain (1 - 3)  morphine  - Injectable 2 milliGRAM(s) IV Push every 3 hours PRN Moderate Pain (4 - 6)      Vital Signs Last 24 Hrs  T(C): 36.7 (21 Oct 2021 17:16), Max: 36.7 (21 Oct 2021 16:34)  T(F): 98.1 (21 Oct 2021 16:34), Max: 98.1 (21 Oct 2021 16:34)  HR: 87 (21 Oct 2021 17:16) (80 - 92)  BP: 131/61 (21 Oct 2021 17:16) (126/77 - 153/66)  BP(mean): --  RR: 18 (21 Oct 2021 17:16) (18 - 18)  SpO2: 95% (21 Oct 2021 17:16) (95% - 99%) I&O's Detail    PHYSICAL EXAM:          REVIEW OF SYSTEMS            ECG:  ECHOCARDIOGRAM:    RADIOLOGY & ADDITIONAL STUDIES:  < from: CT Pelvis Bony Only No Cont (10.20.21 @ 21:02) >  Findings:    There is a partially included tip of the liver which appears to be elongated. No evidence of bowel obstruction. Scattered colonic diverticula are noted. No evidence of pelvic sidewall hematoma or lymphadenopathy.    There is a left femoral neck fracture (predominantly midcervical) with anterior angulation. Left hip lipohemarthrosis. No other fractures are identified. There is diffuse osteopenia. There are mild bilateral acetabular bony productive changes. Lower lumbar and bilateral sacroiliac degenerative changes are noted as well. Fatty atrophy of the left gluteus medius muscle laterally. Bilateral fatty atrophy of bilateral gluteus minimus muscles.    Impression:    Left femoral neck fracture as above.      < end of copied text >      LABS:                        13.5   10.25 )-----------( 268      ( 21 Oct 2021 06:54 )             42.2     10-21    140  |  104  |  14  ----------------------------<  115<H>  4.3   |  19  |  0.7    Ca    9.6      21 Oct 2021 06:54  Phos  3.9     10-21  Mg     2.2     10-21    TPro  6.9  /  Alb  4.3  /  TBili  1.1  /  DBili  x   /  AST  751<H>  /  ALT  476<H>  /  AlkPhos  118<H>  10-21    CARDIAC MARKERS ( 20 Oct 2021 19:50 )  x     / <0.01 ng/mL / x     / x     / x          PT/INR - ( 20 Oct 2021 21:51 )   PT: 11.90 sec;   INR: 1.04 ratio         PTT - ( 20 Oct 2021 21:51 )  PTT:32.4 sec  I&O's Summary       Patient was seen and examined by me.  Son is in the room at bedside.    Ms. Meri Feliz is an 87-year-old  woman with a past medical history of CAD S/P MI S/P PTCA/Stent, HTN, Powers Esophagus, Hiatal Hernia, Hyperlipidemia and Arthritis.  She has not followed with any cardiologist for many years.  Her last visit with Dr. Dudley was about 10 years ago.    She denies any chest pain and shortness of breath with her usual activities of daily living.  She is referred to cardiology for pre-op risk assessment.  __________________________________________________________________________________________    Patient is a 87y old  Female who presents with a chief complaint of Fall (21 Oct 2021 12:21)      REASON FOR CONSULT     HPI:  88 YO F with PMHx of yolette esophagus, CAD s/p 2 stents in 2010 (on plavix), BPPV presented to the ED with mechanical fall.  Collateral history obtained from son at bedside. Patient states that she attempted to turn and fell on L hip onto the concrete. Denies any head injury or LOC. Not on any anticoagulation.  Patient denies fevers, chills, headache, cough, shortness of breath, chest pain, palpitations, abdominal pain, nausea, vomiting, diarrhea, constipation, melena, hematochezia, dysuria, urinary frequency or urgency, numbness, tingling, recent travel or any known sick contact.   In the ED VS were unremarkable. Trauma workup showed L femoral neck fracture  (21 Oct 2021 00:37)      PAST MEDICAL & SURGICAL HISTORY:  CAD (coronary artery disease)    HTN (hypertension)    Arthritis    Barretts esophagus    No significant past surgical history            SOCIAL HISTORY:     FAMILY HISTORY:    penicillin (Hives)      MEDICATIONS  (STANDING):  atorvastatin 40 milliGRAM(s) Oral at bedtime  enoxaparin Injectable 40 milliGRAM(s) SubCutaneous daily  melatonin 5 milliGRAM(s) Oral at bedtime  metoprolol tartrate 25 milliGRAM(s) Oral two times a day  pantoprazole    Tablet 40 milliGRAM(s) Oral before breakfast  senna 2 Tablet(s) Oral at bedtime  sodium chloride 0.9%. 1000 milliLiter(s) (75 mL/Hr) IV Continuous <Continuous>    MEDICATIONS  (PRN):  acetaminophen   Tablet .. 650 milliGRAM(s) Oral every 6 hours PRN Mild Pain (1 - 3)  morphine  - Injectable 2 milliGRAM(s) IV Push every 3 hours PRN Moderate Pain (4 - 6)      Vital Signs Last 24 Hrs  T(C): 36.7 (21 Oct 2021 17:16), Max: 36.7 (21 Oct 2021 16:34)  T(F): 98.1 (21 Oct 2021 16:34), Max: 98.1 (21 Oct 2021 16:34)  HR: 87 (21 Oct 2021 17:16) (80 - 92)  BP: 131/61 (21 Oct 2021 17:16) (126/77 - 153/66)  BP(mean): --  RR: 18 (21 Oct 2021 17:16) (18 - 18)  SpO2: 95% (21 Oct 2021 17:16) (95% - 99%) I&O's Detail    PHYSICAL EXAM:  Not in apparent distress  Normocephalic; atraumatic  Alert, oriented x 3  No JVD; regular rhythm; nl S1S2  Bilateral breath sounds anteriorly  Abdomen soft  No edema  No focal neurologic deficits    REVIEW OF SYSTEMS: Negative except as stated in HPI    ECG: Sinus Rhythm    ECHOCARDIOGRAM:    RADIOLOGY & ADDITIONAL STUDIES:  < from: CT Pelvis Bony Only No Cont (10.20.21 @ 21:02) >  Findings:    There is a partially included tip of the liver which appears to be elongated. No evidence of bowel obstruction. Scattered colonic diverticula are noted. No evidence of pelvic sidewall hematoma or lymphadenopathy.    There is a left femoral neck fracture (predominantly midcervical) with anterior angulation. Left hip lipohemarthrosis. No other fractures are identified. There is diffuse osteopenia. There are mild bilateral acetabular bony productive changes. Lower lumbar and bilateral sacroiliac degenerative changes are noted as well. Fatty atrophy of the left gluteus medius muscle laterally. Bilateral fatty atrophy of bilateral gluteus minimus muscles.    Impression:    Left femoral neck fracture as above.      < end of copied text >      LABS:                        13.5   10.25 )-----------( 268      ( 21 Oct 2021 06:54 )             42.2     10-21    140  |  104  |  14  ----------------------------<  115<H>  4.3   |  19  |  0.7    Ca    9.6      21 Oct 2021 06:54  Phos  3.9     10-21  Mg     2.2     10-21    TPro  6.9  /  Alb  4.3  /  TBili  1.1  /  DBili  x   /  AST  751<H>  /  ALT  476<H>  /  AlkPhos  118<H>  10-21    CARDIAC MARKERS ( 20 Oct 2021 19:50 )  x     / <0.01 ng/mL / x     / x     / x          PT/INR - ( 20 Oct 2021 21:51 )   PT: 11.90 sec;   INR: 1.04 ratio         PTT - ( 20 Oct 2021 21:51 )  PTT:32.4 sec  I&O's Summary       Patient was seen and examined by me.  Son is in the room at bedside.    Ms. Meri Feliz is an 87-year-old  woman with a past medical history of CAD S/P MI S/P PTCA/Stent, HTN, Powers Esophagus, Hiatal Hernia, Hyperlipidemia and Arthritis.  She has not followed with any cardiologist for many years.  Her last visit with Dr. Dudley was about 10 years ago.    She denies any chest pain and shortness of breath with her usual activities of daily living.  She is referred to cardiology for pre-op risk assessment.  __________________________________________________________________________________________    Patient is a 87y old  Female who presents with a chief complaint of Fall (21 Oct 2021 12:21)      REASON FOR CONSULT     HPI:  86 YO F with PMHx of yolette esophagus, CAD s/p 2 stents in 2010 (on plavix), BPPV presented to the ED with mechanical fall.  Collateral history obtained from son at bedside. Patient states that she attempted to turn and fell on L hip onto the concrete. Denies any head injury or LOC. Not on any anticoagulation.  Patient denies fevers, chills, headache, cough, shortness of breath, chest pain, palpitations, abdominal pain, nausea, vomiting, diarrhea, constipation, melena, hematochezia, dysuria, urinary frequency or urgency, numbness, tingling, recent travel or any known sick contact.   In the ED VS were unremarkable. Trauma workup showed L femoral neck fracture  (21 Oct 2021 00:37)      PAST MEDICAL & SURGICAL HISTORY:  CAD (coronary artery disease)    HTN (hypertension)    Arthritis    Barretts esophagus    No significant past surgical history            SOCIAL HISTORY: Denies smoking    FAMILY HISTORY: Non-contributory    penicillin (Hives)    ________________________________________________________________________________________________________________________________  MEDICATIONS  (STANDING):  atorvastatin 40 milliGRAM(s) Oral at bedtime  enoxaparin Injectable 40 milliGRAM(s) SubCutaneous daily  melatonin 5 milliGRAM(s) Oral at bedtime  metoprolol tartrate 25 milliGRAM(s) Oral two times a day  pantoprazole    Tablet 40 milliGRAM(s) Oral before breakfast  senna 2 Tablet(s) Oral at bedtime  sodium chloride 0.9%. 1000 milliLiter(s) (75 mL/Hr) IV Continuous <Continuous>    MEDICATIONS  (PRN):  acetaminophen   Tablet .. 650 milliGRAM(s) Oral every 6 hours PRN Mild Pain (1 - 3)  morphine  - Injectable 2 milliGRAM(s) IV Push every 3 hours PRN Moderate Pain (4 - 6)      Vital Signs Last 24 Hrs  T(C): 36.7 (21 Oct 2021 17:16), Max: 36.7 (21 Oct 2021 16:34)  T(F): 98.1 (21 Oct 2021 16:34), Max: 98.1 (21 Oct 2021 16:34)  HR: 87 (21 Oct 2021 17:16) (80 - 92)  BP: 131/61 (21 Oct 2021 17:16) (126/77 - 153/66)  BP(mean): --  RR: 18 (21 Oct 2021 17:16) (18 - 18)  SpO2: 95% (21 Oct 2021 17:16) (95% - 99%) I&O's Detail    PHYSICAL EXAM:  Not in apparent distress  Normocephalic; atraumatic  Alert, oriented x 3  No JVD; regular rhythm; nl S1S2  Bilateral breath sounds anteriorly  Abdomen soft  No edema  No focal neurologic deficits    REVIEW OF SYSTEMS: Negative except as stated in HPI    ECG: Sinus Rhythm with PVC's    ECHOCARDIOGRAM:    RADIOLOGY & ADDITIONAL STUDIES:  < from: Xray Chest 1 View- PORTABLE-Urgent (10.20.21 @ 22:44) >  Findings:    Support devices: None.    Cardiac/mediastinum/hilum: Hiatal hernia.    Lung parenchyma/Pleura: Within normal limits.    Skeleton/soft tissues: Degenerative changes of the spine and shoulders noted.    Impression:    No radiographic evidence of acute cardiopulmonary dise    < end of copied text >    < from: CT Pelvis Bony Only No Cont (10.20.21 @ 21:02) >  Findings:    There is a partially included tip of the liver which appears to be elongated. No evidence of bowel obstruction. Scattered colonic diverticula are noted. No evidence of pelvic sidewall hematoma or lymphadenopathy.    There is a left femoral neck fracture (predominantly midcervical) with anterior angulation. Left hip lipohemarthrosis. No other fractures are identified. There is diffuse osteopenia. There are mild bilateral acetabular bony productive changes. Lower lumbar and bilateral sacroiliac degenerative changes are noted as well. Fatty atrophy of the left gluteus medius muscle laterally. Bilateral fatty atrophy of bilateral gluteus minimus muscles.    Impression:    Left femoral neck fracture as above.      < end of copied text >      LABS:                        13.5   10.25 )-----------( 268      ( 21 Oct 2021 06:54 )             42.2     10-21    140  |  104  |  14  ----------------------------<  115<H>  4.3   |  19  |  0.7    Ca    9.6      21 Oct 2021 06:54  Phos  3.9     10-21  Mg     2.2     10-21    TPro  6.9  /  Alb  4.3  /  TBili  1.1  /  DBili  x   /  AST  751<H>  /  ALT  476<H>  /  AlkPhos  118<H>  10-21    CARDIAC MARKERS ( 20 Oct 2021 19:50 )  x     / <0.01 ng/mL / x     / x     / x          PT/INR - ( 20 Oct 2021 21:51 )   PT: 11.90 sec;   INR: 1.04 ratio         PTT - ( 20 Oct 2021 21:51 )  PTT:32.4 sec  I&O's Summary

## 2021-10-21 NOTE — PROGRESS NOTE ADULT - SUBJECTIVE AND OBJECTIVE BOX
PROGRESS NOTE



Patient is seen for followup for acute kidney injury and hypercalcemia.  Her renal

function continues to improve.  The patient is maintained on IV fluids at 60 mL an

hour.  Her potassium was low at 3.1 early this morning.



PHYSICAL EXAMINATION:

On examination, blood pressure is 137/74, heart rate 78 per minute, patient is

afebrile.

Examination of the heart S1, S2.  Examination of the lungs, bilateral breath sounds are

heard.  Abdomen is soft, nontender.  Examination of the lower extremities shows edema

1+ bilaterally. CNS exam shows patient is moving all 4 extremities.  She is confused.



LABS:

Show sodium 140, potassium 3.1, chloride 111, BUN 48, creatinine 1.3, calcium is 9.7.



ASSESSMENT:

1. Acute kidney injury associated with hypercalcemia and volume depletion, currently

    improved significantly.

2. Hypercalcemia from metastatic breast cancer, status post pamidronate, now improved.

3. Hypokalemia secondary to decreased oral intake.  We will replace aggressively.

4. Mental status changes with evidence of brain metastasis.



PLAN:

Continue to encourage increased oral intake.  Replace potassium aggressively.





MMODL / IJN: [id number] / Job#: [id number] pt seen and examined.   has severe hip pain  mechanical fall    T(F): 97.6 (10-21-21 @ 09:04), Max: 97.7 (10-21-21 @ 00:22)  HR: 80 (10-21-21 @ 09:04) (77 - 92)  BP: 139/63 (10-21-21 @ 09:04) (126/77 - 168/80)  RR: 18 (10-21-21 @ 09:04) (18 - 18)  SpO2: 95% (10-21-21 @ 09:04) (95% - 99%)    Physical exam:   constitutional NAD, AAOX3, Respiratory  lungs CTA, CVS heart RRR, GI: abdomen Soft NT, ND, BS+, skin: intact  neuro exam Motor, sensory and CN normal, no deficit     < from: 12 Lead ECG (10.20.21 @ 21:34) >  Normal sinus rhythm    < end of copied text >                          13.5   10.25 )-----------( 268      ( 21 Oct 2021 06:54 )             42.2   10-21    140  |  104  |  14  ----------------------------<  115<H>  4.3   |  19  |  0.7    Ca    9.6      21 Oct 2021 06:54  Phos  3.9     10-21  Mg     2.2     10-21    TPro  6.9  /  Alb  4.3  /  TBili  1.1  /  DBili  x   /  AST  751<H>  /  ALT  476<H>  /  AlkPhos  118<H>  10-21    < from: Xray Hip 2-3 Views, Left (10.20.21 @ 21:31) >  Left femoral neck fracture, suboptimally evaluated.    < end of copied text >    a/p  mechanical fall, left femoral neck fx, plan is for surgery in am   pt is moderate risk for surgery .   no active cp, sob, or any active issues  pain : morphine  dvt prophylaxis lovenox  npo after midnight       PAST MEDICAL & SURGICAL HISTORY: all stable cont meds  CAD (coronary artery disease)    HTN (hypertension)    Arthritis    Barretts esophagus    No significant past surgical history    #Progress Note Handoff  Pending (specify):  Consults_ ortho, tests: echo   Family discussion: dw pt   Disposition: from home, post op will probably need acute or subacute rehab

## 2021-10-21 NOTE — H&P ADULT - HISTORY OF PRESENT ILLNESS
88 YO F with PMHx of yolette esophagus, CAD s/p 2 stents in 2010 (on plavix), BPPV presented to the ED with mechanical fall.  Patient states that she     attempted to turn and fell on L hip onto concrete, no Head injury, no LOC, not on A/C.  Only pain in L hip.  Pt denies chest pain, SOB, abdominal pain, focal weakness, headache. 88 YO F with PMHx of yolette esophagus, CAD s/p 2 stents in 2010 (on plavix), BPPV presented to the ED with mechanical fall.  Collateral history obtained from son at bedside. Patient states that she attempted to turn and fell on L hip onto the concrete. Denies any head injury or LOC. Not on any anticoagulation.  Patient denies fevers, chills, headache, cough, shortness of breath, chest pain, palpitations, abdominal pain, nausea, vomiting, diarrhea, constipation, melena, hematochezia, dysuria, urinary frequency or urgency, numbness, tingling, recent travel or any known sick contact.   In the ED VS were unremarkable. Trauma workup showed L femoral neck fracture

## 2021-10-21 NOTE — CONSULT NOTE ADULT - SUBJECTIVE AND OBJECTIVE BOX
Pt Name: ALMA MIGUEL  MRN: [id number]      HPI: 87yFemale with pmhx of CAD s/p stenting, HTN, Powers's esophagus, osteoporosis on [place], presenting with L hip pain after ground level fall. -HT, -LOC. Patient endorsing pain in L hip, denies pain elsewhere. Denies numbness/tingling. Patient does not use assistive devices for ambulation at baseline.     PAST MEDICAL & SURGICAL HISTORY:  CAD (coronary artery disease)  HTN (hypertension)  Arthritis  Barretts esophagus    PSHx:  Cardiac stents x 2  DNC  C-sections     Allergies: penicillin (Hives)    Medications: atorvastatin 40 milliGRAM(s) Oral at bedtime  metoprolol tartrate 25 milliGRAM(s) Oral two times a day  pantoprazole    Tablet 40 milliGRAM(s) Oral before breakfast      PHYSICAL EXAM:    Vital Signs Last 24 Hrs  T(C): 36.5 (21 Oct 2021 00:22), Max: 36.5 (21 Oct 2021 00:22)  T(F): 97.7 (21 Oct 2021 00:22), Max: 97.7 (21 Oct 2021 00:22)  HR: 92 (21 Oct 2021 00:22) (77 - 92)  BP: 153/66 (21 Oct 2021 00:22) (126/77 - 168/80)  BP(mean): --  RR: 18 (21 Oct 2021 00:22) (18 - 18)  SpO2: 95% (21 Oct 2021 00:22) (95% - 99%)    Physical Exam:  General: NAD, Alert, Awake and oriented    Bilateral UE  No open skin or wounds  NTTP shoulder, upper arm, elbow, forearm, wrist or hand  Full baseline painless ROM at shoulder, elbow, wrist, and   SILT in axillary, musculocutaneous,  radial, median, and ulnar distributions.   AIN/PIN/U motor intact  Hands wwp, CR <2s    RLE:  No open skin or wounds  Mild ttp at R ankle/ tibial shaft; nttp remainder of RLE  Unable to tolerate ROM of RLE due to referred pain to L hip   Able to tolerate SLR  SILT DP/SP/T/Weeks/Sa.   EHL/FHL/TA/Gs motor intact.  Foot wwp, CR <2s, DP pulse 2+    LLE:   LLE sitting in flexed, ER position   No open skin or wounds  NTTP proximal femur/groin  Compartments soft, compressible   SILT DP/SP/T/Weeks/Sa.   EHL/FHL/TA/Gs motor intact.  Foot wwp, CR <2s, DP pulse 2+    Labs:                        14.0   12.01 )-----------( 293      ( 20 Oct 2021 19:50 )             43.6     10-20    139  |  106  |  15  ----------------------------<  101<H>  3.9   |  18  |  0.7    Ca    9.8      20 Oct 2021 19:50  Mg     2.1     10-20    TPro  6.9  /  Alb  4.3  /  TBili  0.3  /  DBili  x   /  AST  23  /  ALT  18  /  AlkPhos  74  10-20    PT/INR - ( 20 Oct 2021 21:51 )   PT: 11.90 sec;   INR: 1.04 ratio         PTT - ( 20 Oct 2021 21:51 )  PTT:32.4 sec    Imaging:   XR AP pelvis: L femoral neck fracture (complete, displaced)  XR L femur, knee, tib/fib, ankle, foot: no additional acute fractures or dislocations appreciated     A/P:    87y Female with L femoral neck fracture s/p ground level fall earlier today, indicated for L hip hemiarthroplasty versus total hip arthroplasty with orthopaedics. Plan for surgery likely later today, 10/21/2021, pending medical clearances.  -NWB LLE   -pain control  -please give one dose of LVX this evening  -pre-op labs: CBC, CMP, Coags, Type & Screen x2, COVID test  -please obtain completion imaging: XR R ankle, R tib/fib   -EKG, CXR  -IS  -case added on     Please call ortho with any additional questions or concerns

## 2021-10-21 NOTE — PRE-ANESTHESIA EVALUATION ADULT - NSANTHPMHFT_GEN_ALL_CORE
Cardiology Clearance Pending
pt seen and examined.   has severe hip pain  mechanical fall    T(F): 97.6 (10-21-21 @ 09:04), Max: 97.7 (10-21-21 @ 00:22)  HR: 80 (10-21-21 @ 09:04) (77 - 92)  BP: 139/63 (10-21-21 @ 09:04) (126/77 - 168/80)  RR: 18 (10-21-21 @ 09:04) (18 - 18)  SpO2: 95% (10-21-21 @ 09:04) (95% - 99%)    Physical exam:   constitutional NAD, AAOX3, Respiratory  lungs CTA, CVS heart RRR, GI: abdomen Soft NT, ND, BS+, skin: intact  neuro exam Motor, sensory and CN normal, no deficit     < from: 12 Lead ECG (10.20.21 @ 21:34) >  Normal sinus rhythm    < end of copied text >                          13.5   10.25 )-----------( 268      ( 21 Oct 2021 06:54 )             42.2   10-21    140  |  104  |  14  ----------------------------<  115<H>  4.3   |  19  |  0.7    Ca    9.6      21 Oct 2021 06:54  Phos  3.9     10-21  Mg     2.2     10-21    TPro  6.9  /  Alb  4.3  /  TBili  1.1  /  DBili  x   /  AST  751<H>  /  ALT  476<H>  /  AlkPhos  118<H>  10-21    < from: Xray Hip 2-3 Views, Left (10.20.21 @ 21:31) >  Left femoral neck fracture, suboptimally evaluated.    < end of copied text >    a/p  mechanical fall, left femoral neck fx, plan is for surgery in am   pt is moderate risk for surgery .   no active cp, sob, or any active issues  pain : morphine  dvt prophylaxis lovenox  npo after midnight       PAST MEDICAL & SURGICAL HISTORY: all stable cont meds  CAD (coronary artery disease)    HTN (hypertension)    Arthritis    Barretts esophagus    No significant past surgical history    #Progress Note Handoff  Pending (specify):  Consults_ ortho, tests: echo   Family discussion: dw pt   Disposition: from home, post op will probably need acute or subacute rehab

## 2021-10-21 NOTE — H&P ADULT - ASSESSMENT
#L femoral neck fracture        #Misc  - DVT Prophylaxis: Lovenox 1 dose STAT. then hold for possible OR in AM  - Diet: NPO  - GI Prophylaxis: Pantoprazole   - Activity: Bedrest   - Code Status: Full Code    Dispo: Admit to medicine  86 YO F with PMHx of yolette esophagus, CAD s/p 2 stents in 2010 (on plavix), BPPV presented to the ED with mechanical fall.    #L femoral neck fracture  - Possible OR tomorrow  - NPO  - Pre-op labs in chart  - Tylenol and morphine for pain control   - Will get echo and Dr. Dudley for Cardiac clearance as per hospitalist attending     #CAD s/p PCI in 2010  - Currently only on plavix. Will hold for now  - Continue Metoprolol     #Misc  - DVT Prophylaxis: Lovenox 1 dose STAT. then hold for possible OR in AM  - Diet: NPO  - GI Prophylaxis: Pantoprazole   - Activity: Bedrest   - Code Status: Full Code    Dispo: Admit to medicine

## 2021-10-22 ENCOUNTER — RESULT REVIEW (OUTPATIENT)
Age: 86
End: 2021-10-22

## 2021-10-22 LAB
ALBUMIN SERPL ELPH-MCNC: 3.8 G/DL — SIGNIFICANT CHANGE UP (ref 3.5–5.2)
ALP SERPL-CCNC: 109 U/L — SIGNIFICANT CHANGE UP (ref 30–115)
ALT FLD-CCNC: 271 U/L — HIGH (ref 0–41)
ANION GAP SERPL CALC-SCNC: 14 MMOL/L — SIGNIFICANT CHANGE UP (ref 7–14)
ANION GAP SERPL CALC-SCNC: 16 MMOL/L — HIGH (ref 7–14)
AST SERPL-CCNC: 177 U/L — HIGH (ref 0–41)
BASOPHILS # BLD AUTO: 0.03 K/UL — SIGNIFICANT CHANGE UP (ref 0–0.2)
BASOPHILS NFR BLD AUTO: 0.3 % — SIGNIFICANT CHANGE UP (ref 0–1)
BILIRUB SERPL-MCNC: 0.8 MG/DL — SIGNIFICANT CHANGE UP (ref 0.2–1.2)
BUN SERPL-MCNC: 13 MG/DL — SIGNIFICANT CHANGE UP (ref 10–20)
BUN SERPL-MCNC: 13 MG/DL — SIGNIFICANT CHANGE UP (ref 10–20)
CALCIUM SERPL-MCNC: 9 MG/DL — SIGNIFICANT CHANGE UP (ref 8.5–10.1)
CALCIUM SERPL-MCNC: 9.2 MG/DL — SIGNIFICANT CHANGE UP (ref 8.5–10.1)
CHLORIDE SERPL-SCNC: 106 MMOL/L — SIGNIFICANT CHANGE UP (ref 98–110)
CHLORIDE SERPL-SCNC: 107 MMOL/L — SIGNIFICANT CHANGE UP (ref 98–110)
CO2 SERPL-SCNC: 19 MMOL/L — SIGNIFICANT CHANGE UP (ref 17–32)
CO2 SERPL-SCNC: 21 MMOL/L — SIGNIFICANT CHANGE UP (ref 17–32)
COVID-19 SPIKE DOMAIN AB INTERP: POSITIVE
COVID-19 SPIKE DOMAIN ANTIBODY RESULT: >250 U/ML — HIGH
CREAT SERPL-MCNC: 0.6 MG/DL — LOW (ref 0.7–1.5)
CREAT SERPL-MCNC: 0.6 MG/DL — LOW (ref 0.7–1.5)
EOSINOPHIL # BLD AUTO: 0 K/UL — SIGNIFICANT CHANGE UP (ref 0–0.7)
EOSINOPHIL NFR BLD AUTO: 0 % — SIGNIFICANT CHANGE UP (ref 0–8)
GLUCOSE BLDC GLUCOMTR-MCNC: 103 MG/DL — HIGH (ref 70–99)
GLUCOSE BLDC GLUCOMTR-MCNC: 104 MG/DL — HIGH (ref 70–99)
GLUCOSE BLDC GLUCOMTR-MCNC: 129 MG/DL — HIGH (ref 70–99)
GLUCOSE SERPL-MCNC: 108 MG/DL — HIGH (ref 70–99)
GLUCOSE SERPL-MCNC: 152 MG/DL — HIGH (ref 70–99)
HCT VFR BLD CALC: 40.1 % — SIGNIFICANT CHANGE UP (ref 37–47)
HCT VFR BLD CALC: 42.5 % — SIGNIFICANT CHANGE UP (ref 37–47)
HGB BLD-MCNC: 12.9 G/DL — SIGNIFICANT CHANGE UP (ref 12–16)
HGB BLD-MCNC: 13.6 G/DL — SIGNIFICANT CHANGE UP (ref 12–16)
IMM GRANULOCYTES NFR BLD AUTO: 0.5 % — HIGH (ref 0.1–0.3)
INR BLD: 1.16 RATIO — SIGNIFICANT CHANGE UP (ref 0.65–1.3)
LYMPHOCYTES # BLD AUTO: 1.03 K/UL — LOW (ref 1.2–3.4)
LYMPHOCYTES # BLD AUTO: 9.1 % — LOW (ref 20.5–51.1)
MAGNESIUM SERPL-MCNC: 2.2 MG/DL — SIGNIFICANT CHANGE UP (ref 1.8–2.4)
MCHC RBC-ENTMCNC: 29.4 PG — SIGNIFICANT CHANGE UP (ref 27–31)
MCHC RBC-ENTMCNC: 29.6 PG — SIGNIFICANT CHANGE UP (ref 27–31)
MCHC RBC-ENTMCNC: 32 G/DL — SIGNIFICANT CHANGE UP (ref 32–37)
MCHC RBC-ENTMCNC: 32.2 G/DL — SIGNIFICANT CHANGE UP (ref 32–37)
MCV RBC AUTO: 91.3 FL — SIGNIFICANT CHANGE UP (ref 81–99)
MCV RBC AUTO: 92.6 FL — SIGNIFICANT CHANGE UP (ref 81–99)
MONOCYTES # BLD AUTO: 0.86 K/UL — HIGH (ref 0.1–0.6)
MONOCYTES NFR BLD AUTO: 7.6 % — SIGNIFICANT CHANGE UP (ref 1.7–9.3)
NEUTROPHILS # BLD AUTO: 9.34 K/UL — HIGH (ref 1.4–6.5)
NEUTROPHILS NFR BLD AUTO: 82.5 % — HIGH (ref 42.2–75.2)
NRBC # BLD: 0 /100 WBCS — SIGNIFICANT CHANGE UP (ref 0–0)
NRBC # BLD: 0 /100 WBCS — SIGNIFICANT CHANGE UP (ref 0–0)
PLATELET # BLD AUTO: 219 K/UL — SIGNIFICANT CHANGE UP (ref 130–400)
PLATELET # BLD AUTO: 223 K/UL — SIGNIFICANT CHANGE UP (ref 130–400)
POTASSIUM SERPL-MCNC: 4 MMOL/L — SIGNIFICANT CHANGE UP (ref 3.5–5)
POTASSIUM SERPL-MCNC: 4.2 MMOL/L — SIGNIFICANT CHANGE UP (ref 3.5–5)
POTASSIUM SERPL-SCNC: 4 MMOL/L — SIGNIFICANT CHANGE UP (ref 3.5–5)
POTASSIUM SERPL-SCNC: 4.2 MMOL/L — SIGNIFICANT CHANGE UP (ref 3.5–5)
PROT SERPL-MCNC: 6.3 G/DL — SIGNIFICANT CHANGE UP (ref 6–8)
PROTHROM AB SERPL-ACNC: 13.3 SEC — HIGH (ref 9.95–12.87)
RBC # BLD: 4.39 M/UL — SIGNIFICANT CHANGE UP (ref 4.2–5.4)
RBC # BLD: 4.59 M/UL — SIGNIFICANT CHANGE UP (ref 4.2–5.4)
RBC # FLD: 13.2 % — SIGNIFICANT CHANGE UP (ref 11.5–14.5)
RBC # FLD: 13.2 % — SIGNIFICANT CHANGE UP (ref 11.5–14.5)
SARS-COV-2 IGG+IGM SERPL QL IA: >250 U/ML — HIGH
SARS-COV-2 IGG+IGM SERPL QL IA: POSITIVE
SODIUM SERPL-SCNC: 141 MMOL/L — SIGNIFICANT CHANGE UP (ref 135–146)
SODIUM SERPL-SCNC: 142 MMOL/L — SIGNIFICANT CHANGE UP (ref 135–146)
WBC # BLD: 11.32 K/UL — HIGH (ref 4.8–10.8)
WBC # BLD: 16.93 K/UL — HIGH (ref 4.8–10.8)
WBC # FLD AUTO: 11.32 K/UL — HIGH (ref 4.8–10.8)
WBC # FLD AUTO: 16.93 K/UL — HIGH (ref 4.8–10.8)

## 2021-10-22 PROCEDURE — 99233 SBSQ HOSP IP/OBS HIGH 50: CPT

## 2021-10-22 PROCEDURE — 72170 X-RAY EXAM OF PELVIS: CPT | Mod: 26

## 2021-10-22 PROCEDURE — 88305 TISSUE EXAM BY PATHOLOGIST: CPT | Mod: 26

## 2021-10-22 PROCEDURE — 88311 DECALCIFY TISSUE: CPT | Mod: 26

## 2021-10-22 RX ORDER — FOLIC ACID 0.8 MG
1 TABLET ORAL DAILY
Refills: 0 | Status: DISCONTINUED | OUTPATIENT
Start: 2021-10-22 | End: 2021-10-26

## 2021-10-22 RX ORDER — ONDANSETRON 8 MG/1
4 TABLET, FILM COATED ORAL ONCE
Refills: 0 | Status: DISCONTINUED | OUTPATIENT
Start: 2021-10-22 | End: 2021-10-22

## 2021-10-22 RX ORDER — ATORVASTATIN CALCIUM 80 MG/1
40 TABLET, FILM COATED ORAL AT BEDTIME
Refills: 0 | Status: DISCONTINUED | OUTPATIENT
Start: 2021-10-22 | End: 2021-10-26

## 2021-10-22 RX ORDER — SODIUM CHLORIDE 9 MG/ML
1000 INJECTION, SOLUTION INTRAVENOUS ONCE
Refills: 0 | Status: DISCONTINUED | OUTPATIENT
Start: 2021-10-22 | End: 2021-10-22

## 2021-10-22 RX ORDER — HYDROMORPHONE HYDROCHLORIDE 2 MG/ML
0.5 INJECTION INTRAMUSCULAR; INTRAVENOUS; SUBCUTANEOUS
Refills: 0 | Status: DISCONTINUED | OUTPATIENT
Start: 2021-10-22 | End: 2021-10-22

## 2021-10-22 RX ORDER — LANOLIN ALCOHOL/MO/W.PET/CERES
5 CREAM (GRAM) TOPICAL AT BEDTIME
Refills: 0 | Status: DISCONTINUED | OUTPATIENT
Start: 2021-10-22 | End: 2021-10-26

## 2021-10-22 RX ORDER — CEFAZOLIN SODIUM 1 G
2000 VIAL (EA) INJECTION EVERY 8 HOURS
Refills: 0 | Status: DISCONTINUED | OUTPATIENT
Start: 2021-10-22 | End: 2021-10-22

## 2021-10-22 RX ORDER — CELECOXIB 200 MG/1
200 CAPSULE ORAL EVERY 12 HOURS
Refills: 0 | Status: DISCONTINUED | OUTPATIENT
Start: 2021-10-23 | End: 2021-10-23

## 2021-10-22 RX ORDER — SENNA PLUS 8.6 MG/1
2 TABLET ORAL AT BEDTIME
Refills: 0 | Status: DISCONTINUED | OUTPATIENT
Start: 2021-10-22 | End: 2021-10-26

## 2021-10-22 RX ORDER — HYDROMORPHONE HYDROCHLORIDE 2 MG/ML
1 INJECTION INTRAMUSCULAR; INTRAVENOUS; SUBCUTANEOUS
Refills: 0 | Status: DISCONTINUED | OUTPATIENT
Start: 2021-10-22 | End: 2021-10-22

## 2021-10-22 RX ORDER — ENOXAPARIN SODIUM 100 MG/ML
40 INJECTION SUBCUTANEOUS EVERY 24 HOURS
Refills: 0 | Status: DISCONTINUED | OUTPATIENT
Start: 2021-10-22 | End: 2021-10-26

## 2021-10-22 RX ORDER — ASCORBIC ACID 60 MG
500 TABLET,CHEWABLE ORAL
Refills: 0 | Status: DISCONTINUED | OUTPATIENT
Start: 2021-10-22 | End: 2021-10-26

## 2021-10-22 RX ORDER — ONDANSETRON 8 MG/1
4 TABLET, FILM COATED ORAL EVERY 6 HOURS
Refills: 0 | Status: DISCONTINUED | OUTPATIENT
Start: 2021-10-22 | End: 2021-10-26

## 2021-10-22 RX ORDER — PANTOPRAZOLE SODIUM 20 MG/1
40 TABLET, DELAYED RELEASE ORAL
Refills: 0 | Status: DISCONTINUED | OUTPATIENT
Start: 2021-10-22 | End: 2021-10-26

## 2021-10-22 RX ORDER — OXYCODONE HYDROCHLORIDE 5 MG/1
10 TABLET ORAL
Refills: 0 | Status: DISCONTINUED | OUTPATIENT
Start: 2021-10-22 | End: 2021-10-26

## 2021-10-22 RX ORDER — GABAPENTIN 400 MG/1
300 CAPSULE ORAL EVERY 12 HOURS
Refills: 0 | Status: DISCONTINUED | OUTPATIENT
Start: 2021-10-22 | End: 2021-10-22

## 2021-10-22 RX ORDER — OXYCODONE HYDROCHLORIDE 5 MG/1
5 TABLET ORAL
Refills: 0 | Status: DISCONTINUED | OUTPATIENT
Start: 2021-10-22 | End: 2021-10-26

## 2021-10-22 RX ORDER — SODIUM CHLORIDE 9 MG/ML
1000 INJECTION, SOLUTION INTRAVENOUS
Refills: 0 | Status: DISCONTINUED | OUTPATIENT
Start: 2021-10-22 | End: 2021-10-22

## 2021-10-22 RX ORDER — CEFAZOLIN SODIUM 1 G
3000 VIAL (EA) INJECTION EVERY 8 HOURS
Refills: 0 | Status: DISCONTINUED | OUTPATIENT
Start: 2021-10-22 | End: 2021-10-22

## 2021-10-22 RX ORDER — METOPROLOL TARTRATE 50 MG
25 TABLET ORAL
Refills: 0 | Status: DISCONTINUED | OUTPATIENT
Start: 2021-10-22 | End: 2021-10-26

## 2021-10-22 RX ADMIN — PANTOPRAZOLE SODIUM 40 MILLIGRAM(S): 20 TABLET, DELAYED RELEASE ORAL at 06:41

## 2021-10-22 RX ADMIN — Medication 5 MILLIGRAM(S): at 21:30

## 2021-10-22 RX ADMIN — MORPHINE SULFATE 2 MILLIGRAM(S): 50 CAPSULE, EXTENDED RELEASE ORAL at 10:43

## 2021-10-22 RX ADMIN — SENNA PLUS 2 TABLET(S): 8.6 TABLET ORAL at 21:31

## 2021-10-22 RX ADMIN — ATORVASTATIN CALCIUM 40 MILLIGRAM(S): 80 TABLET, FILM COATED ORAL at 21:30

## 2021-10-22 RX ADMIN — HYDROMORPHONE HYDROCHLORIDE 0.5 MILLIGRAM(S): 2 INJECTION INTRAMUSCULAR; INTRAVENOUS; SUBCUTANEOUS at 15:35

## 2021-10-22 RX ADMIN — Medication 1 TABLET(S): at 21:28

## 2021-10-22 RX ADMIN — Medication 25 MILLIGRAM(S): at 06:41

## 2021-10-22 RX ADMIN — MORPHINE SULFATE 2 MILLIGRAM(S): 50 CAPSULE, EXTENDED RELEASE ORAL at 06:41

## 2021-10-22 RX ADMIN — Medication 25 MILLIGRAM(S): at 17:26

## 2021-10-22 RX ADMIN — HYDROMORPHONE HYDROCHLORIDE 0.5 MILLIGRAM(S): 2 INJECTION INTRAMUSCULAR; INTRAVENOUS; SUBCUTANEOUS at 15:30

## 2021-10-22 RX ADMIN — HYDROMORPHONE HYDROCHLORIDE 1 MILLIGRAM(S): 2 INJECTION INTRAMUSCULAR; INTRAVENOUS; SUBCUTANEOUS at 14:50

## 2021-10-22 RX ADMIN — SODIUM CHLORIDE 75 MILLILITER(S): 9 INJECTION INTRAMUSCULAR; INTRAVENOUS; SUBCUTANEOUS at 02:46

## 2021-10-22 RX ADMIN — ENOXAPARIN SODIUM 40 MILLIGRAM(S): 100 INJECTION SUBCUTANEOUS at 17:27

## 2021-10-22 RX ADMIN — MORPHINE SULFATE 2 MILLIGRAM(S): 50 CAPSULE, EXTENDED RELEASE ORAL at 02:46

## 2021-10-22 RX ADMIN — MORPHINE SULFATE 2 MILLIGRAM(S): 50 CAPSULE, EXTENDED RELEASE ORAL at 11:25

## 2021-10-22 RX ADMIN — Medication 500 MILLIGRAM(S): at 21:30

## 2021-10-22 NOTE — BRIEF OPERATIVE NOTE - OPERATION/FINDINGS
EXTERNAL ROTATORS WERE FOUND TO BE TORN OFF, POSSIBLY DURING INJURY.   FEMORAL IMPLANTS PLACED WITHOUT COMPLICATION    POSTOP  WBAT LLE  PLAVIX TO BE RESTARTED AT 48HRS POSTOP (LVX OR HSQ IN THE INTERIM)  OOBTC  PAIN CONTROL

## 2021-10-22 NOTE — PROGRESS NOTE ADULT - SUBJECTIVE AND OBJECTIVE BOX
ALMA MIGUEL 87y Female  MRN#: [id number]   CODE STATUS:________    SUBJECTIVE  Patient is a 87y old Female who presents with a chief complaint of Fall (21 Oct 2021 21:09)  Currently admitted to medicine with the primary diagnosis of Hip fracture, left     Today is hospital day 2d, and this morning she is resting comfortably and reports no overnight events.     OBJECTIVE  PAST MEDICAL & SURGICAL HISTORY  CAD (coronary artery disease)    HTN (hypertension)    Arthritis    Barretts esophagus    No significant past surgical history      ALLERGIES:  penicillin (Hives)    MEDICATIONS:  STANDING MEDICATIONS  atorvastatin 40 milliGRAM(s) Oral at bedtime  enoxaparin Injectable 40 milliGRAM(s) SubCutaneous daily  melatonin 5 milliGRAM(s) Oral at bedtime  metoprolol tartrate 25 milliGRAM(s) Oral two times a day  pantoprazole    Tablet 40 milliGRAM(s) Oral before breakfast  senna 2 Tablet(s) Oral at bedtime  sodium chloride 0.9%. 1000 milliLiter(s) IV Continuous <Continuous>    PRN MEDICATIONS  acetaminophen   Tablet .. 650 milliGRAM(s) Oral every 6 hours PRN  morphine  - Injectable 2 milliGRAM(s) IV Push every 3 hours PRN      VITAL SIGNS: Last 24 Hours  T(C): 36.8 (22 Oct 2021 06:11), Max: 36.8 (22 Oct 2021 06:11)  T(F): 98.3 (22 Oct 2021 06:11), Max: 98.3 (22 Oct 2021 06:11)  HR: 98 (22 Oct 2021 06:11) (80 - 98)  BP: 107/71 (22 Oct 2021 06:11) (107/71 - 139/63)  BP(mean): --  RR: 18 (22 Oct 2021 06:11) (18 - 18)  SpO2: 92% (22 Oct 2021 03:02) (89% - 95%)    LABS:                        13.5   10.25 )-----------( 268      ( 21 Oct 2021 06:54 )             42.2     10-21    140  |  104  |  14  ----------------------------<  115<H>  4.3   |  19  |  0.7    Ca    9.6      21 Oct 2021 06:54  Phos  3.9     10-21  Mg     2.2     10-21    TPro  6.9  /  Alb  4.3  /  TBili  1.1  /  DBili  x   /  AST  751<H>  /  ALT  476<H>  /  AlkPhos  118<H>  10-21    PT/INR - ( 20 Oct 2021 21:51 )   PT: 11.90 sec;   INR: 1.04 ratio         PTT - ( 20 Oct 2021 21:51 )  PTT:32.4 sec          CARDIAC MARKERS ( 20 Oct 2021 19:50 )  x     / <0.01 ng/mL / x     / x     / x          RADIOLOGY:    PHYSICAL EXAM:    GENERAL: NAD  HEENT:  Atraumatic, Normocephalic  PULMONARY: Clear to auscultation bilaterally; No wheeze  CARDIOVASCULAR: Regular rate and rhythm; No murmurs, rubs, or gallops  GASTROINTESTINAL: Soft, Nontender, Nondistended; Bowel sounds present  MUSCULOSKELETAL:  No clubbing, cyanosis, or edema  NEUROLOGY: non-focal  SKIN: No rashes or lesions      ASSESSMENT & PLAN  86 YO F with PMHx of yolette esophagus, CAD s/p 2 stents in 2010 (on plavix), BPPV presented to the ED with mechanical fall.    #L femoral neck fracture  - Possible OR today  - NPO  - Tylenol and morphine for pain control   - Cardio clearance given    #CAD s/p PCI in 2010  - Currently only on plavix. Will hold for now  - Continue Metoprolol     #Misc  - DVT Prophylaxis: Lovenox   - Diet: NPO  - GI Prophylaxis: Pantoprazole   - Activity: Bedrest   - Code Status: Full Code  Dispo: pending OR     ALMA MIGUEL 87y Female  MRN#: [id number]   CODE STATUS:________    SUBJECTIVE  Patient is a 87y old Female who presents with a chief complaint of Fall (21 Oct 2021 21:09)  Currently admitted to medicine with the primary diagnosis of Hip fracture, left     Today is hospital day 2d, and this morning she is resting comfortably and reports no overnight events.     OBJECTIVE  PAST MEDICAL & SURGICAL HISTORY  CAD (coronary artery disease)    HTN (hypertension)    Arthritis    Barretts esophagus    No significant past surgical history      ALLERGIES:  penicillin (Hives)    MEDICATIONS:  STANDING MEDICATIONS  atorvastatin 40 milliGRAM(s) Oral at bedtime  enoxaparin Injectable 40 milliGRAM(s) SubCutaneous daily  melatonin 5 milliGRAM(s) Oral at bedtime  metoprolol tartrate 25 milliGRAM(s) Oral two times a day  pantoprazole    Tablet 40 milliGRAM(s) Oral before breakfast  senna 2 Tablet(s) Oral at bedtime  sodium chloride 0.9%. 1000 milliLiter(s) IV Continuous <Continuous>    PRN MEDICATIONS  acetaminophen   Tablet .. 650 milliGRAM(s) Oral every 6 hours PRN  morphine  - Injectable 2 milliGRAM(s) IV Push every 3 hours PRN      VITAL SIGNS: Last 24 Hours  T(C): 36.8 (22 Oct 2021 06:11), Max: 36.8 (22 Oct 2021 06:11)  T(F): 98.3 (22 Oct 2021 06:11), Max: 98.3 (22 Oct 2021 06:11)  HR: 98 (22 Oct 2021 06:11) (80 - 98)  BP: 107/71 (22 Oct 2021 06:11) (107/71 - 139/63)  BP(mean): --  RR: 18 (22 Oct 2021 06:11) (18 - 18)  SpO2: 92% (22 Oct 2021 03:02) (89% - 95%)    LABS:                        13.5   10.25 )-----------( 268      ( 21 Oct 2021 06:54 )             42.2     10-21    140  |  104  |  14  ----------------------------<  115<H>  4.3   |  19  |  0.7    Ca    9.6      21 Oct 2021 06:54  Phos  3.9     10-21  Mg     2.2     10-21    TPro  6.9  /  Alb  4.3  /  TBili  1.1  /  DBili  x   /  AST  751<H>  /  ALT  476<H>  /  AlkPhos  118<H>  10-21    PT/INR - ( 20 Oct 2021 21:51 )   PT: 11.90 sec;   INR: 1.04 ratio    PTT - ( 20 Oct 2021 21:51 )  PTT:32.4 sec    CARDIAC MARKERS ( 20 Oct 2021 19:50 )  x     / <0.01 ng/mL / x     / x     / x            PHYSICAL EXAM:    GENERAL: NAD  HEENT:  Atraumatic, Normocephalic  PULMONARY: Clear to auscultation bilaterally; No wheeze  CARDIOVASCULAR: Regular rate and rhythm; No murmurs, rubs, or gallops  GASTROINTESTINAL: Soft, Nontender, Nondistended; Bowel sounds present  MUSCULOSKELETAL:  No clubbing, cyanosis, or edema  NEUROLOGY: non-focal  SKIN: No rashes or lesions      ASSESSMENT & PLAN  88 YO F with PMHx of yolette esophagus, CAD s/p 2 stents in 2010 (on plavix), BPPV presented to the ED with mechanical fall.    #L femoral neck fracture  - Possible OR today  - NPO  - Tylenol and morphine for pain control   - Cardio clearance given    #CAD s/p PCI in 2010  - Currently only on plavix. Will hold for now  - Continue Metoprolol     #Misc  - DVT Prophylaxis: Lovenox   - Diet: NPO  - GI Prophylaxis: Pantoprazole   - Activity: Bedrest   - Code Status: Full Code  Dispo: pending OR     ALMA MIGUEL 87y Female  MRN#: [id number]   CODE STATUS:________    SUBJECTIVE  Patient is a 87y old Female who presents with a chief complaint of Fall (21 Oct 2021 21:09)  Currently admitted to medicine with the primary diagnosis of Hip fracture, left     Today is hospital day 2d, and this morning she is resting comfortably and reports no overnight events.     ROS no cp, no sob, no fever    OBJECTIVE  PAST MEDICAL & SURGICAL HISTORY  CAD (coronary artery disease)    HTN (hypertension)    Arthritis    Barretts esophagus    No significant past surgical history      ALLERGIES:  penicillin (Hives)    MEDICATIONS:  STANDING MEDICATIONS  atorvastatin 40 milliGRAM(s) Oral at bedtime  enoxaparin Injectable 40 milliGRAM(s) SubCutaneous daily  melatonin 5 milliGRAM(s) Oral at bedtime  metoprolol tartrate 25 milliGRAM(s) Oral two times a day  pantoprazole    Tablet 40 milliGRAM(s) Oral before breakfast  senna 2 Tablet(s) Oral at bedtime  sodium chloride 0.9%. 1000 milliLiter(s) IV Continuous <Continuous>    PRN MEDICATIONS  acetaminophen   Tablet .. 650 milliGRAM(s) Oral every 6 hours PRN  morphine  - Injectable 2 milliGRAM(s) IV Push every 3 hours PRN      VITAL SIGNS: Last 24 Hours  T(C): 36.8 (22 Oct 2021 06:11), Max: 36.8 (22 Oct 2021 06:11)  T(F): 98.3 (22 Oct 2021 06:11), Max: 98.3 (22 Oct 2021 06:11)  HR: 98 (22 Oct 2021 06:11) (80 - 98)  BP: 107/71 (22 Oct 2021 06:11) (107/71 - 139/63)  BP(mean): --  RR: 18 (22 Oct 2021 06:11) (18 - 18)  SpO2: 92% (22 Oct 2021 03:02) (89% - 95%)    LABS:                        13.5   10.25 )-----------( 268      ( 21 Oct 2021 06:54 )             42.2     10-21    140  |  104  |  14  ----------------------------<  115<H>  4.3   |  19  |  0.7    Ca    9.6      21 Oct 2021 06:54  Phos  3.9     10-21  Mg     2.2     10-21    TPro  6.9  /  Alb  4.3  /  TBili  1.1  /  DBili  x   /  AST  751<H>  /  ALT  476<H>  /  AlkPhos  118<H>  10-21    PT/INR - ( 20 Oct 2021 21:51 )   PT: 11.90 sec;   INR: 1.04 ratio    PTT - ( 20 Oct 2021 21:51 )  PTT:32.4 sec    CARDIAC MARKERS ( 20 Oct 2021 19:50 )  x     / <0.01 ng/mL / x     / x     / x            PHYSICAL EXAM:    GENERAL: NAD  HEENT:  Atraumatic, Normocephalic  PULMONARY: Clear to auscultation bilaterally; No wheeze  CARDIOVASCULAR: Regular rate and rhythm; No murmurs, rubs, or gallops  GASTROINTESTINAL: Soft, Nontender, Nondistended; Bowel sounds present  MUSCULOSKELETAL:  No clubbing, cyanosis, or edema  NEUROLOGY: non-focal  SKIN: No rashes or lesions      ASSESSMENT & PLAN  86 YO F with PMHx of yolette esophagus, CAD s/p 2 stents in 2010 (on plavix), BPPV presented to the ED with mechanical fall.    #L femoral neck fracture  - Possible OR today  - NPO  - Tylenol and morphine for pain control   - Cardio clearance given    #CAD s/p PCI in 2010  - Currently only on plavix. Will hold for now  - Continue Metoprolol     #Misc  - DVT Prophylaxis: Lovenox   - Diet: NPO  - GI Prophylaxis: Pantoprazole   - Activity: Bedrest   - Code Status: Full Code  Dispo: pending OR

## 2021-10-22 NOTE — PROGRESS NOTE ADULT - SUBJECTIVE AND OBJECTIVE BOX
agree with assessment  left hip fn fx  will plan for [place]  r/b/a explained  all questions answered and consent obtained

## 2021-10-22 NOTE — PRE-ANESTHESIA EVALUATION ADULT - NSANTHASARD_GEN_ALL_CORE
Pt. Son has requested that PT/OT be done in the afternoon, after breakfast and lunch. So that his mom does not require pain medications before meal times.   
3

## 2021-10-22 NOTE — CHART NOTE - NSCHARTNOTEFT_GEN_A_CORE
PACU ANESTHESIA ADMISSION NOTE      Procedure: L hip fx  Post op diagnosis: L hip hemiarthroplasty      ____  Intubated  TV:______       Rate: ______      FiO2: ______    __x__  Patent Airway    __x__  Full return of protective reflexes    __x__  Full recovery from anesthesia / back to baseline status    Vitals:  T(C): 36.7 (10-22-21 @ 11:46), Max: 36.8 (10-22-21 @ 06:11)  HR: 76 (10-22-21 @ 11:46) (76 - 98)  BP: 128/70 (10-22-21 @ 11:46) (107/71 - 131/62)  RR: 18 (10-22-21 @ 11:46) (18 - 18)  SpO2: 95% (10-22-21 @ 11:46) (89% - 95%)    Mental Status:  __x__ Awake   ___x__ Alert   _____ Drowsy   _____ Sedated    Nausea/Vomiting:  __x__ NO  ______Yes,   See Post - Op Orders          Pain Scale (0-10):  _0____    Treatment: ____ None    __x__ See Post - Op/PCA Orders    Post - Operative Fluids:   ____ Oral   __x__ See Post - Op Orders    Plan: Discharge:   ____Home       _x____Floor     _____Critical Care    _____  Other:_________________    Comments: Patient had smooth intraoperative event, no anesthesia complication.  PACU Vital signs: HR:     104       BP:   165     /   82       RR:   18          O2 Sat: 94      %     Temp 36
Patient refusing any IV antibiotics due to a "reaction" that she had in the past. Unable to recall what exactly the reaction was, but she "swore she would never take any IV antibiotics again". I advised her that we can find alternative regimens to offer, but she promptly refused. Will D/C the prophylactic cefazolin at this time.

## 2021-10-22 NOTE — PROGRESS NOTE ADULT - ATTENDING COMMENTS
Patient seen and examined independently. Agree with resident note/ history / physical exam and plan of care with following exceptions/additions/updates. Case discussed with patient/pt decision maker, house-staff and nursing. My notes supersedes the resident's notes in case of discrepancies.      #Progress Note Handoff  Pending (specify):  OR today, post op will need PT eval   Family discussion: dw pt  Disposition: from Home, will need snf or acute rehab post surgery

## 2021-10-23 LAB
ALBUMIN SERPL ELPH-MCNC: 3.5 G/DL — SIGNIFICANT CHANGE UP (ref 3.5–5.2)
ALP SERPL-CCNC: 110 U/L — SIGNIFICANT CHANGE UP (ref 30–115)
ALT FLD-CCNC: 147 U/L — HIGH (ref 0–41)
ANION GAP SERPL CALC-SCNC: 14 MMOL/L — SIGNIFICANT CHANGE UP (ref 7–14)
AST SERPL-CCNC: 81 U/L — HIGH (ref 0–41)
BASOPHILS # BLD AUTO: 0.02 K/UL — SIGNIFICANT CHANGE UP (ref 0–0.2)
BASOPHILS NFR BLD AUTO: 0.1 % — SIGNIFICANT CHANGE UP (ref 0–1)
BILIRUB SERPL-MCNC: 0.6 MG/DL — SIGNIFICANT CHANGE UP (ref 0.2–1.2)
BUN SERPL-MCNC: 15 MG/DL — SIGNIFICANT CHANGE UP (ref 10–20)
CALCIUM SERPL-MCNC: 9 MG/DL — SIGNIFICANT CHANGE UP (ref 8.5–10.1)
CHLORIDE SERPL-SCNC: 105 MMOL/L — SIGNIFICANT CHANGE UP (ref 98–110)
CO2 SERPL-SCNC: 22 MMOL/L — SIGNIFICANT CHANGE UP (ref 17–32)
CREAT SERPL-MCNC: 0.6 MG/DL — LOW (ref 0.7–1.5)
EOSINOPHIL # BLD AUTO: 0 K/UL — SIGNIFICANT CHANGE UP (ref 0–0.7)
EOSINOPHIL NFR BLD AUTO: 0 % — SIGNIFICANT CHANGE UP (ref 0–8)
GLUCOSE SERPL-MCNC: 147 MG/DL — HIGH (ref 70–99)
HCT VFR BLD CALC: 38.3 % — SIGNIFICANT CHANGE UP (ref 37–47)
HGB BLD-MCNC: 12.6 G/DL — SIGNIFICANT CHANGE UP (ref 12–16)
IMM GRANULOCYTES NFR BLD AUTO: 0.7 % — HIGH (ref 0.1–0.3)
LYMPHOCYTES # BLD AUTO: 0.86 K/UL — LOW (ref 1.2–3.4)
LYMPHOCYTES # BLD AUTO: 5.8 % — LOW (ref 20.5–51.1)
MAGNESIUM SERPL-MCNC: 2 MG/DL — SIGNIFICANT CHANGE UP (ref 1.8–2.4)
MCHC RBC-ENTMCNC: 30 PG — SIGNIFICANT CHANGE UP (ref 27–31)
MCHC RBC-ENTMCNC: 32.9 G/DL — SIGNIFICANT CHANGE UP (ref 32–37)
MCV RBC AUTO: 91.2 FL — SIGNIFICANT CHANGE UP (ref 81–99)
MONOCYTES # BLD AUTO: 1.38 K/UL — HIGH (ref 0.1–0.6)
MONOCYTES NFR BLD AUTO: 9.2 % — SIGNIFICANT CHANGE UP (ref 1.7–9.3)
NEUTROPHILS # BLD AUTO: 12.56 K/UL — HIGH (ref 1.4–6.5)
NEUTROPHILS NFR BLD AUTO: 84.2 % — HIGH (ref 42.2–75.2)
NRBC # BLD: 0 /100 WBCS — SIGNIFICANT CHANGE UP (ref 0–0)
PLATELET # BLD AUTO: 225 K/UL — SIGNIFICANT CHANGE UP (ref 130–400)
POTASSIUM SERPL-MCNC: 3.9 MMOL/L — SIGNIFICANT CHANGE UP (ref 3.5–5)
POTASSIUM SERPL-SCNC: 3.9 MMOL/L — SIGNIFICANT CHANGE UP (ref 3.5–5)
PROT SERPL-MCNC: 5.7 G/DL — LOW (ref 6–8)
RBC # BLD: 4.2 M/UL — SIGNIFICANT CHANGE UP (ref 4.2–5.4)
RBC # FLD: 13.2 % — SIGNIFICANT CHANGE UP (ref 11.5–14.5)
SODIUM SERPL-SCNC: 141 MMOL/L — SIGNIFICANT CHANGE UP (ref 135–146)
WBC # BLD: 14.93 K/UL — HIGH (ref 4.8–10.8)
WBC # FLD AUTO: 14.93 K/UL — HIGH (ref 4.8–10.8)

## 2021-10-23 PROCEDURE — 99233 SBSQ HOSP IP/OBS HIGH 50: CPT

## 2021-10-23 RX ORDER — CEFAZOLIN SODIUM 1 G
2000 VIAL (EA) INJECTION EVERY 8 HOURS
Refills: 0 | Status: DISCONTINUED | OUTPATIENT
Start: 2021-10-23 | End: 2021-10-23

## 2021-10-23 RX ORDER — CLOPIDOGREL BISULFATE 75 MG/1
75 TABLET, FILM COATED ORAL DAILY
Refills: 0 | Status: DISCONTINUED | OUTPATIENT
Start: 2021-10-24 | End: 2021-10-26

## 2021-10-23 RX ADMIN — PANTOPRAZOLE SODIUM 40 MILLIGRAM(S): 20 TABLET, DELAYED RELEASE ORAL at 06:06

## 2021-10-23 RX ADMIN — Medication 1 TABLET(S): at 13:02

## 2021-10-23 RX ADMIN — Medication 25 MILLIGRAM(S): at 17:23

## 2021-10-23 RX ADMIN — Medication 1 TABLET(S): at 21:24

## 2021-10-23 RX ADMIN — ONDANSETRON 4 MILLIGRAM(S): 8 TABLET, FILM COATED ORAL at 00:12

## 2021-10-23 RX ADMIN — SENNA PLUS 2 TABLET(S): 8.6 TABLET ORAL at 21:23

## 2021-10-23 RX ADMIN — OXYCODONE HYDROCHLORIDE 10 MILLIGRAM(S): 5 TABLET ORAL at 02:57

## 2021-10-23 RX ADMIN — Medication 5 MILLIGRAM(S): at 21:23

## 2021-10-23 RX ADMIN — Medication 100 MILLIGRAM(S): at 21:24

## 2021-10-23 RX ADMIN — Medication 1 TABLET(S): at 12:05

## 2021-10-23 RX ADMIN — Medication 25 MILLIGRAM(S): at 06:07

## 2021-10-23 RX ADMIN — ATORVASTATIN CALCIUM 40 MILLIGRAM(S): 80 TABLET, FILM COATED ORAL at 21:24

## 2021-10-23 RX ADMIN — Medication 1 MILLIGRAM(S): at 12:05

## 2021-10-23 RX ADMIN — Medication 500 MILLIGRAM(S): at 17:23

## 2021-10-23 RX ADMIN — OXYCODONE HYDROCHLORIDE 5 MILLIGRAM(S): 5 TABLET ORAL at 00:04

## 2021-10-23 RX ADMIN — Medication 100 MILLIGRAM(S): at 16:31

## 2021-10-23 RX ADMIN — ENOXAPARIN SODIUM 40 MILLIGRAM(S): 100 INJECTION SUBCUTANEOUS at 17:23

## 2021-10-23 RX ADMIN — CELECOXIB 200 MILLIGRAM(S): 200 CAPSULE ORAL at 06:06

## 2021-10-23 RX ADMIN — OXYCODONE HYDROCHLORIDE 5 MILLIGRAM(S): 5 TABLET ORAL at 14:00

## 2021-10-23 RX ADMIN — OXYCODONE HYDROCHLORIDE 5 MILLIGRAM(S): 5 TABLET ORAL at 12:05

## 2021-10-23 NOTE — PROVIDER CONTACT NOTE (OTHER) - SITUATION
While changing pt this am at approx 0600-Pts dsg on L hip found to have been removed, upon assessment, 1 staple missing and light bleeding at sight. DSD put in place for reinforcement. No other

## 2021-10-23 NOTE — OCCUPATIONAL THERAPY INITIAL EVALUATION ADULT - PERTINENT HX OF CURRENT PROBLEM, REHAB EVAL
Pt is a right hand dominant female admitted s/p mechanical fall. Pt found to have L hip fx, and is POD 1 L hip [place] arthroplasty

## 2021-10-23 NOTE — PHYSICAL THERAPY INITIAL EVALUATION ADULT - TRANSFER SAFETY CONCERNS NOTED: BED/CHAIR, REHAB EVAL
decreased balance during turns/decreased sequencing ability/stepping too close to front of assistive device/decreased weight-shifting ability

## 2021-10-23 NOTE — OCCUPATIONAL THERAPY INITIAL EVALUATION ADULT - SHORT TERM MEMORY, REHAB EVAL
Pt was able to repeat 3/3 words recall 2/3 words immediately following 2 min delay and the third word with increased time. Pt reports mild word finding difficulty since sx.

## 2021-10-23 NOTE — PROGRESS NOTE ADULT - SUBJECTIVE AND OBJECTIVE BOX
This is a late entry:  Patient was seen and examined by me on 4A in the evening of Oct 22, 2021.   Daughter is in room at bedside.    Patient is S/P Hip Surgery.  She tolerated procedure without post-op cardiac complications.    Vitals:  T(C): 36.2 (10-23-21 @ 05:17), Max: 37 (10-22-21 @ 14:30)  HR: 72 (10-23-21 @ 05:17) (72 - 104)  BP: 136/72 (10-23-21 @ 05:17) (107/71 - 165/80)  RR: 18 (10-23-21 @ 05:17) (10 - 25)  SpO2: 97% (10-22-21 @ 20:00) (89% - 99%)    LABS:                        12.6   14.93 )-----------( 225      ( 23 Oct 2021 04:30 )             38.3     10-23    141  |  105  |  15  ----------------------------<  147<H>  3.9   |  22  |  0.6<L>    Ca    9.0      23 Oct 2021 04:30  Mg     2.0     10-23    TPro  5.7<L>  /  Alb  3.5  /  TBili  0.6  /  DBili  x   /  AST  81<H>  /  ALT  147<H>  /  AlkPhos  110  10-23    PT/INR - ( 22 Oct 2021 15:50 )   PT: 13.30 sec;   INR: 1.16 ratio           MEDICATIONS  (STANDING):  ascorbic acid 500 milliGRAM(s) Oral two times a day  atorvastatin 40 milliGRAM(s) Oral at bedtime  calcium carbonate 1250 mG  + Vitamin D (OsCal 500 + D) 1 Tablet(s) Oral three times a day  ceFAZolin   IVPB 2000 milliGRAM(s) IV Intermittent every 8 hours  celecoxib 200 milliGRAM(s) Oral every 12 hours  enoxaparin Injectable 40 milliGRAM(s) SubCutaneous every 24 hours  folic acid 1 milliGRAM(s) Oral daily  melatonin 5 milliGRAM(s) Oral at bedtime  metoprolol tartrate 25 milliGRAM(s) Oral two times a day  multivitamin 1 Tablet(s) Oral daily  pantoprazole    Tablet 40 milliGRAM(s) Oral before breakfast  senna 2 Tablet(s) Oral at bedtime    MEDICATIONS  (PRN):  ondansetron Injectable 4 milliGRAM(s) IV Push every 6 hours PRN Nausea and/or Vomiting  oxyCODONE    IR 5 milliGRAM(s) Oral every 3 hours PRN Moderate Pain (4 - 6)  oxyCODONE    IR 10 milliGRAM(s) Oral every 3 hours PRN Severe Pain (7 - 10)      PHYSICAL EXAM:  Constitutional: appears stated age, well developed/nourished, no acute distress  Eyes: EOM's intact.  PERRLA  ENMT: Normocephalic, atraumatic.    Neck: Jugular veins non-distended; no carotid bruits bilaterally.  Respiratory: lungs clear to auscultation bilaterally.  Cardiovascular: Regular rhythm.  S1 and S2 normal.  No murmur nor rub appreciated.  Abdomen: Soft, non-tender.  Normal bowel sounds.  Extremities: extremities warm  Skin: No gross abnormalities noted.  Musculoskeletal: No gross deformities  Neurological: Alert, oriented x 3.  No focal neurologic deficits noted.    ech

## 2021-10-23 NOTE — PHYSICAL THERAPY INITIAL EVALUATION ADULT - ADDITIONAL COMMENTS
Lives alone in a condo, no stairs to enter. Was independent in ADL's and ambulation without assistive device.

## 2021-10-23 NOTE — PROGRESS NOTE ADULT - SUBJECTIVE AND OBJECTIVE BOX
pt seen and examined.     HPI:  88 YO F with PMHx of yolette esophagus, CAD s/p 2 stents in 2010 (on plavix), BPPV presented to the ED with mechanical fall.  Collateral history obtained from son at bedside. Patient states that she attempted to turn and fell on L hip onto the concrete. Denies any head injury or LOC. Not on any anticoagulation.  Patient denies fevers, chills, headache, cough, shortness of breath, chest pain, palpitations, abdominal pain, nausea, vomiting, diarrhea, constipation, melena, hematochezia, dysuria, urinary frequency or urgency, numbness, tingling, recent travel or any known sick contact.   In the ED VS were unremarkable. Trauma workup showed L femoral neck fracture  (21 Oct 2021 00:37)      ROS: no cp, no sob, no n/v, no fever    Vital Signs Last 24 Hrs  T(C): 36.2 (23 Oct 2021 05:17), Max: 37 (22 Oct 2021 14:30)  T(F): 97.1 (23 Oct 2021 05:17), Max: 98.6 (22 Oct 2021 14:30)  HR: 72 (23 Oct 2021 05:17) (72 - 104)  BP: 136/72 (23 Oct 2021 05:17) (121/55 - 165/80)  BP(mean): --  RR: 18 (23 Oct 2021 05:17) (10 - 25)  SpO2: 97% (22 Oct 2021 20:00) (93% - 99%)    Physical exam:   constitutional NAD, AAOX3, Respiratory  lungs CTA, CVS heart RRR, GI: abdomen Soft NT, ND, BS+, skin: intact  neuro exam Motor, sensory and CN normal, no deficit     POCT Blood Glucose.: 129 mg/dL (10-22-21 @ 15:11)  POCT Blood Glucose.: 104 mg/dL (10-22-21 @ 13:41)                          12.6   14.93 )-----------( 225      ( 23 Oct 2021 04:30 )             38.3     10-23    141  |  105  |  15  ----------------------------<  147<H>  3.9   |  22  |  0.6<L>    Ca    9.0      23 Oct 2021 04:30  Mg     2.0     10-23    TPro  5.7<L>  /  Alb  3.5  /  TBili  0.6  /  DBili  x   /  AST  81<H>  /  ALT  147<H>  /  AlkPhos  110  10-23    COVID-19 PCR: NotDetec (10-20-21 @ 19:03)    PT/INR - ( 22 Oct 2021 15:50 )   PT: 13.30 sec;   INR: 1.16 ratio        MEDICATIONS  (STANDING):  ascorbic acid 500 milliGRAM(s) Oral two times a day  atorvastatin 40 milliGRAM(s) Oral at bedtime  calcium carbonate 1250 mG  + Vitamin D (OsCal 500 + D) 1 Tablet(s) Oral three times a day  ceFAZolin   IVPB 2000 milliGRAM(s) IV Intermittent every 8 hours  celecoxib 200 milliGRAM(s) Oral every 12 hours  enoxaparin Injectable 40 milliGRAM(s) SubCutaneous every 24 hours  folic acid 1 milliGRAM(s) Oral daily  melatonin 5 milliGRAM(s) Oral at bedtime  metoprolol tartrate 25 milliGRAM(s) Oral two times a day  multivitamin 1 Tablet(s) Oral daily  pantoprazole    Tablet 40 milliGRAM(s) Oral before breakfast  senna 2 Tablet(s) Oral at bedtime    MEDICATIONS  (PRN):  ondansetron Injectable 4 milliGRAM(s) IV Push every 6 hours PRN Nausea and/or Vomiting  oxyCODONE    IR 5 milliGRAM(s) Oral every 3 hours PRN Moderate Pain (4 - 6)  oxyCODONE    IR 10 milliGRAM(s) Oral every 3 hours PRN Severe Pain (7 - 10)      PAST MEDICAL & SURGICAL HISTORY:  CAD (coronary artery disease)    HTN (hypertension)    Arthritis    Barretts esophagus    No significant past surgical history    a/p  # hip fracture, secondary to mechanical fall. nos sp surgery on 10.22  EXTERNAL ROTATORS WERE FOUND TO BE TORN OFF, POSSIBLY DURING INJURY.   FEMORAL IMPLANTS PLACED WITHOUT COMPLICATION  DVT prophylaxis  pain control   WBAT LLE  PLAVIX TO BE RESTARTED AT 48HRS POSTOP    # AMS overnight due to sedatives and possible sundowning, now stable and AAOx3,   # HTN, CAD cont meds,     #Progress Note Handoff  Pending (specify):  physiatry   Family discussion: ashu pt   Disposition: Home

## 2021-10-23 NOTE — OCCUPATIONAL THERAPY INITIAL EVALUATION ADULT - PLANNED THERAPY INTERVENTIONS, OT EVAL
ADL retraining/balance training/bed mobility training/joint mobilization/ROM/strengthening/stretching/transfer training

## 2021-10-23 NOTE — CONSULT NOTE ADULT - SUBJECTIVE AND OBJECTIVE BOX
HPI:  88 YO F with PMHx of yolette esophagus, CAD s/p 2 stents in 2010 (on plavix), BPPV presented to the ED with mechanical fall.. Patient states that she attempted to turn and fell on L hip onto the concrete. Denies any head injury or LOC. Not on any anticoagulation.  Patient denies fevers, chills, headache, cough, shortness of breath, chest pain, palpitations, abdominal pain, nausea, vomiting, diarrhea, constipation, melena, hematochezia, dysuria, urinary frequency or urgency, numbness, tingling, recent travel or any known sick contact.   In the ED VS were unremarkable. Trauma workup showed L femoral neck fracture  ptn  under went  lt  HA  post  op  referred top acute  rehab  for eval and tx  seen at  bed  side nad  fu  command  aox3  dtr at  bed  side      PTN  REFERRED TO ACUTE  REHAB  FOR  EVAL AND  TX   PAST MEDICAL & SURGICAL HISTORY:  CAD (coronary artery disease)    HTN (hypertension)    Arthritis    Barretts esophagus    No significant past surgical history        Hospital Course:    TODAY'S SUBJECTIVE & REVIEW OF SYMPTOMS:     Constitutional WNL   Cardio WNL   Resp WNL   GI WNL  Heme WNL  Endo WNL  Skin WNL  MSK WNL  Neuro WNL  Cognitive WNL  Psych WNL      MEDICATIONS  (STANDING):  ascorbic acid 500 milliGRAM(s) Oral two times a day  atorvastatin 40 milliGRAM(s) Oral at bedtime  calcium carbonate 1250 mG  + Vitamin D (OsCal 500 + D) 1 Tablet(s) Oral three times a day  clindamycin IVPB      clindamycin IVPB 600 milliGRAM(s) IV Intermittent once  clindamycin IVPB 600 milliGRAM(s) IV Intermittent every 8 hours  enoxaparin Injectable 40 milliGRAM(s) SubCutaneous every 24 hours  folic acid 1 milliGRAM(s) Oral daily  melatonin 5 milliGRAM(s) Oral at bedtime  metoprolol tartrate 25 milliGRAM(s) Oral two times a day  multivitamin 1 Tablet(s) Oral daily  pantoprazole    Tablet 40 milliGRAM(s) Oral before breakfast  senna 2 Tablet(s) Oral at bedtime    MEDICATIONS  (PRN):  ondansetron Injectable 4 milliGRAM(s) IV Push every 6 hours PRN Nausea and/or Vomiting  oxyCODONE    IR 5 milliGRAM(s) Oral every 3 hours PRN Moderate Pain (4 - 6)  oxyCODONE    IR 10 milliGRAM(s) Oral every 3 hours PRN Severe Pain (7 - 10)      FAMILY HISTORY:      Allergies    penicillin (Hives)    Intolerances        SOCIAL HISTORY:    [  ] Etoh  [  ] Smoking  [  ] Substance abuse     Home Environment:  [ x ] Home Alone  [  ] Lives with Family  [  ] Home Health Aid    Dwelling:  [ x ] Apartment  [  ] Private House  [  ] Adult Home  [  ] Skilled Nursing Facility      [  ] Short Term  [  ] Long Term  [  ] Stairs       Elevator [ x ]    FUNCTIONAL STATUS PTA: (Check all that apply)  Ambulation: [  x ]Independent    [  ] Dependent     [  ] Non-Ambulatory  Assistive Device: [x  ] SA Cane  [  ]  Q Cane  [  ] Walker  [  ]  Wheelchair  ADL : [ x] Independent  [  ]  Dependent       Vital Signs Last 24 Hrs  T(C): 36.2 (23 Oct 2021 05:17), Max: 37 (22 Oct 2021 14:30)  T(F): 97.1 (23 Oct 2021 05:17), Max: 98.6 (22 Oct 2021 14:30)  HR: 72 (23 Oct 2021 05:17) (72 - 104)  BP: 136/72 (23 Oct 2021 05:17) (121/55 - 165/80)  BP(mean): --  RR: 18 (23 Oct 2021 05:17) (10 - 25)  SpO2: 97% (22 Oct 2021 20:00) (93% - 99%)      PHYSICAL EXAM: Alert & Oriented X3  GENERAL: NAD, well-groomed, well-developed  HEAD:  Atraumatic, Normocephalic  EYES: EOMI, PERRLA, conjunctiva and sclera clear  NECK: Supple, No JVD, Normal thyroid  CHEST/LUNG: Clear to percussion bilaterally; No rales, rhonchi, wheezing, or rubs  HEART: Regular rate and rhythm; No murmurs, rubs, or gallops  ABDOMEN: Soft, Nontender, Nondistended; Bowel sounds present  EXTREMITIES:  2+ Peripheral Pulses, No clubbing, cyanosis, or edema    NERVOUS SYSTEM:  Cranial Nerves 2-12 intact [ x ] Abnormal  [  ]  ROM: WFL all extremities [  ]  Abnormal [ lt le ]  Motor Strength: WFL all extremities  [x ]  Abnormal [  ]  Sensation: intact to light touch [  x] Abnormal [  ]  Reflexes: Symmetric [x  ]  Abnormal [  ]    FUNCTIONAL STATUS:  Bed Mobility: Independent [  ]  Supervision [x  ]  Needs Assistance [  ]  N/A [  ]  Transfers: Independent [  ]  Supervision [ x ]  Needs Assistance [  ]  N/A [  ]   Ambulation: Independent [  ]  Supervision [ x ]  Needs Assistance [  ]  N/A [x  ]  ADL: Independent [  ] Requires Assistance [ x ] N/A [  ]  SEE PT/OT IE NOTES    LABS:                        12.6   14.93 )-----------( 225      ( 23 Oct 2021 04:30 )             38.3     10-23    141  |  105  |  15  ----------------------------<  147<H>  3.9   |  22  |  0.6<L>    Ca    9.0      23 Oct 2021 04:30  Mg     2.0     10-23    TPro  5.7<L>  /  Alb  3.5  /  TBili  0.6  /  DBili  x   /  AST  81<H>  /  ALT  147<H>  /  AlkPhos  110  10-23    PT/INR - ( 22 Oct 2021 15:50 )   PT: 13.30 sec;   INR: 1.16 ratio               RADIOLOGY & ADDITIONAL STUDIES:< from: Xray Hip 2-3 Views, Left (10.20.21 @ 21:31) >  Impression:    Left femoral neck fracture, suboptimally evaluated.    < end of copied text >      Assesment:

## 2021-10-23 NOTE — PHYSICAL THERAPY INITIAL EVALUATION ADULT - PERTINENT HX OF CURRENT PROBLEM, REHAB EVAL
86 YO F with PMHx of yolette esophagus, CAD s/p 2 stents in 2010 (on plavix), BPPV presented to the ED with mechanical fall. Trauma workup showed L femoral neck fracture. s/p L HIP CINDY-ARTHROPLASTY on 10/22/21

## 2021-10-23 NOTE — PHYSICAL THERAPY INITIAL EVALUATION ADULT - TRANSFER SAFETY CONCERNS NOTED: SIT/STAND, REHAB EVAL
decreased safety awareness/decreased sequencing ability/stepping too close to front of assistive device/decreased weight-shifting ability

## 2021-10-23 NOTE — OCCUPATIONAL THERAPY INITIAL EVALUATION ADULT - GENERAL OBSERVATIONS, REHAB EVAL
Pt encountered semi flanagan in bed. +IV lock. 3/10 L hip pain at rest, 7-8/10 L hip pain with mobility. Pt was soiled when attempting to stand from chair. PIPPA Eastman assisted with transferring and repositioning pt in bed. RN Betty britton. Call tran in reach

## 2021-10-23 NOTE — OCCUPATIONAL THERAPY INITIAL EVALUATION ADULT - LIVES WITH, PROFILE
Pt is in the process of moving from apt on 5th floor to 1st floor, new apartment has stall shower, pt has shower chair at home./alone

## 2021-10-23 NOTE — CONSULT NOTE ADULT - ASSESSMENT
IMPRESSION: Rehab of 88 y/o  f rehab  for lt hip  fx  sp  HA gd  sp fall     PRECAUTIONS: [  ] Cardiac  [  ] Respiratory  [  ] Seizures [  ] Contact Isolation  [  ] Droplet Isolation  [ FALL ] Other    Weight Bearing Status:     RECOMMENDATION:    Out of Bed to Chair     DVT/Decubiti Prophylaxis    REHAB PLAN:     [  x ] Bedside P/T 3-5 times a week   [   ]   Bedside O/T  2-3 times a week             [   ] No Rehab Therapy Indicated                   [   ]  Speech Therapy   Conditioning/ROM                                    ADL  Bed Mobility                                               Conditioning/ROM  Transfers                                                     Bed Mobility  Sitting /Standing Balance                         Transfers                                        Gait Training                                               Sitting/Standing Balance  Stair Training [   ]Applicable                    Home equipment Eval                                                                        Splinting  [   ] Only      GOALS:   ADL   [  x ]   Independent                    Transfers  [x   ] Independent                          Ambulation  [  x ] Independent     [ x   ] With device                            [  x ]  CG                                                         [   ]  CG                                                                  [  x ] CG                            [    ] Min A                                                   [   ] Min A                                                              [   ] Min  A          DISCHARGE PLAN:   [   ]  Good candidate for Intensive Rehabilitation/Hospital based-4A [facility]                                             Will tolerate 3hrs Intensive Rehab Daily                                       [   xx ]  Short Term Rehab in Skilled Nursing Facility                                       [    ]  Home with Outpatient or VN services                                         [    ]  Possible Candidate for Intensive Hospital based Rehab                                       
1] Pre-op Evaluation for Left Hip Hemiarthroplasty      S/P Left Femoral Neck Fracture  - RCRI: 1 .  Patient is stable and asymptomatic from cardiac standpoint.  - Patient may proceed with the planned surgery at moderate risk      2] Coronary Artery Disease S/P MI  S/P PTCA/Stent  - Off Plavix.  - Consider Aspirin 81 mg tablet daily    3] Hypertension  - On Metoprolol Tartrate 25 mg tablet twice daily  - Continue to monitor BP    4] Hyperlipidemia  - On statin therapy    5] Powers Esophagus      Hiatal Hernia  - On PPI therapy    DVT prophylaxis  Code status: Full     Will follow    Brendon Drummond MD (covering for Dr. Adrián Dudley)  [phone] Office

## 2021-10-24 LAB
ALBUMIN SERPL ELPH-MCNC: 3.1 G/DL — LOW (ref 3.5–5.2)
ALP SERPL-CCNC: 92 U/L — SIGNIFICANT CHANGE UP (ref 30–115)
ALT FLD-CCNC: 81 U/L — HIGH (ref 0–41)
ANION GAP SERPL CALC-SCNC: 10 MMOL/L — SIGNIFICANT CHANGE UP (ref 7–14)
AST SERPL-CCNC: 33 U/L — SIGNIFICANT CHANGE UP (ref 0–41)
BILIRUB SERPL-MCNC: 0.7 MG/DL — SIGNIFICANT CHANGE UP (ref 0.2–1.2)
BUN SERPL-MCNC: 17 MG/DL — SIGNIFICANT CHANGE UP (ref 10–20)
CALCIUM SERPL-MCNC: 9.4 MG/DL — SIGNIFICANT CHANGE UP (ref 8.5–10.1)
CHLORIDE SERPL-SCNC: 101 MMOL/L — SIGNIFICANT CHANGE UP (ref 98–110)
CO2 SERPL-SCNC: 25 MMOL/L — SIGNIFICANT CHANGE UP (ref 17–32)
CREAT SERPL-MCNC: 0.5 MG/DL — LOW (ref 0.7–1.5)
GLUCOSE SERPL-MCNC: 114 MG/DL — HIGH (ref 70–99)
HCT VFR BLD CALC: 33.3 % — LOW (ref 37–47)
HGB BLD-MCNC: 10.7 G/DL — LOW (ref 12–16)
MAGNESIUM SERPL-MCNC: 2 MG/DL — SIGNIFICANT CHANGE UP (ref 1.8–2.4)
MCHC RBC-ENTMCNC: 29.5 PG — SIGNIFICANT CHANGE UP (ref 27–31)
MCHC RBC-ENTMCNC: 32.1 G/DL — SIGNIFICANT CHANGE UP (ref 32–37)
MCV RBC AUTO: 91.7 FL — SIGNIFICANT CHANGE UP (ref 81–99)
NRBC # BLD: 0 /100 WBCS — SIGNIFICANT CHANGE UP (ref 0–0)
PLATELET # BLD AUTO: 196 K/UL — SIGNIFICANT CHANGE UP (ref 130–400)
POTASSIUM SERPL-MCNC: 4 MMOL/L — SIGNIFICANT CHANGE UP (ref 3.5–5)
POTASSIUM SERPL-SCNC: 4 MMOL/L — SIGNIFICANT CHANGE UP (ref 3.5–5)
PROT SERPL-MCNC: 5.5 G/DL — LOW (ref 6–8)
RBC # BLD: 3.63 M/UL — LOW (ref 4.2–5.4)
RBC # FLD: 13.1 % — SIGNIFICANT CHANGE UP (ref 11.5–14.5)
SODIUM SERPL-SCNC: 136 MMOL/L — SIGNIFICANT CHANGE UP (ref 135–146)
WBC # BLD: 13.21 K/UL — HIGH (ref 4.8–10.8)
WBC # FLD AUTO: 13.21 K/UL — HIGH (ref 4.8–10.8)

## 2021-10-24 PROCEDURE — 99232 SBSQ HOSP IP/OBS MODERATE 35: CPT

## 2021-10-24 RX ADMIN — OXYCODONE HYDROCHLORIDE 5 MILLIGRAM(S): 5 TABLET ORAL at 14:00

## 2021-10-24 RX ADMIN — ATORVASTATIN CALCIUM 40 MILLIGRAM(S): 80 TABLET, FILM COATED ORAL at 21:32

## 2021-10-24 RX ADMIN — OXYCODONE HYDROCHLORIDE 5 MILLIGRAM(S): 5 TABLET ORAL at 06:45

## 2021-10-24 RX ADMIN — OXYCODONE HYDROCHLORIDE 5 MILLIGRAM(S): 5 TABLET ORAL at 19:59

## 2021-10-24 RX ADMIN — Medication 1 MILLIGRAM(S): at 12:06

## 2021-10-24 RX ADMIN — SENNA PLUS 2 TABLET(S): 8.6 TABLET ORAL at 21:31

## 2021-10-24 RX ADMIN — Medication 100 MILLIGRAM(S): at 05:24

## 2021-10-24 RX ADMIN — Medication 500 MILLIGRAM(S): at 05:23

## 2021-10-24 RX ADMIN — Medication 1 TABLET(S): at 12:06

## 2021-10-24 RX ADMIN — Medication 5 MILLIGRAM(S): at 21:32

## 2021-10-24 RX ADMIN — OXYCODONE HYDROCHLORIDE 5 MILLIGRAM(S): 5 TABLET ORAL at 13:10

## 2021-10-24 RX ADMIN — Medication 25 MILLIGRAM(S): at 17:38

## 2021-10-24 RX ADMIN — OXYCODONE HYDROCHLORIDE 5 MILLIGRAM(S): 5 TABLET ORAL at 21:32

## 2021-10-24 RX ADMIN — Medication 1 TABLET(S): at 21:32

## 2021-10-24 RX ADMIN — OXYCODONE HYDROCHLORIDE 5 MILLIGRAM(S): 5 TABLET ORAL at 07:00

## 2021-10-24 RX ADMIN — PANTOPRAZOLE SODIUM 40 MILLIGRAM(S): 20 TABLET, DELAYED RELEASE ORAL at 06:29

## 2021-10-24 RX ADMIN — ENOXAPARIN SODIUM 40 MILLIGRAM(S): 100 INJECTION SUBCUTANEOUS at 17:39

## 2021-10-24 RX ADMIN — CLOPIDOGREL BISULFATE 75 MILLIGRAM(S): 75 TABLET, FILM COATED ORAL at 12:06

## 2021-10-24 RX ADMIN — Medication 500 MILLIGRAM(S): at 17:39

## 2021-10-24 RX ADMIN — Medication 1 TABLET(S): at 05:24

## 2021-10-24 RX ADMIN — Medication 25 MILLIGRAM(S): at 05:24

## 2021-10-24 NOTE — PROGRESS NOTE ADULT - SUBJECTIVE AND OBJECTIVE BOX
Orthopaedic Surgery Progress Note    S&E at bedside this AM. Pain well controlled. Afebrile, VSS. Ambulated yesterday. Doing well without complaints.      T(C): 36.2 (10-24-21 @ 05:36), Max: 36.8 (10-23-21 @ 22:06)  HR: 92 (10-24-21 @ 05:36) (85 - 92)  BP: 133/61 (10-24-21 @ 05:36) (103/51 - 133/61)  RR: 18 (10-24-21 @ 05:36) (18 - 19)  SpO2: 98% (10-23-21 @ 22:06) (92% - 98%)    P/E:  AOx3, NAD  Nonlabored breathing    LLE  Dressing changed  Incisions c/d/i, healing well  new gauze/tegaderm dressing placed  SILT sp/dp/t/sural/saph  Firing ta/ehl/fhl/gs  Foot wwp    Labs                        12.6   14.93 )-----------( 225      ( 23 Oct 2021 04:30 )             38.3     10-23    141  |  105  |  15  ----------------------------<  147<H>  3.9   |  22  |  0.6<L>    Ca    9.0      23 Oct 2021 04:30  Mg     2.0     10-23    TPro  5.7<L>  /  Alb  3.5  /  TBili  0.6  /  DBili  x   /  AST  81<H>  /  ALT  147<H>  /  AlkPhos  110  10-23    LIVER FUNCTIONS - ( 23 Oct 2021 04:30 )  Alb: 3.5 g/dL / Pro: 5.7 g/dL / ALK PHOS: 110 U/L / ALT: 147 U/L / AST: 81 U/L / GGT: x           PT/INR - ( 22 Oct 2021 15:50 )   PT: 13.30 sec;   INR: 1.16 ratio             A/P:   86yo Female  s/p L HIP CINDY-ARTHROPLASTY on 10/22/21, POD#2, doing well.    Weightbearing status: WBAT LLE  Pain control  DVT ppx: scd's + LVX  Restart plavix today  Abx: clindamycin 2 doses postoperatively completed  Dressing changes: to be performed by the orthopaedic surgery service  Home Meds: please reinstate as appropriate  PT/OT/rehab  Dispo: pending

## 2021-10-24 NOTE — PROGRESS NOTE ADULT - SUBJECTIVE AND OBJECTIVE BOX
pt seen and examined.     pt is feeling better. hip pain still present but less intense, no events overnight    HPI:  88 YO F with PMHx of yolette esophagus, CAD s/p 2 stents in 2010 (on plavix), BPPV presented to the ED with mechanical fall.  Patient states that she attempted to turn and fell on L hip onto the concrete. no head injury or LOC.    ROS: no cp, no sob, no n/v, no fever    Vital Signs Last 24 Hrs  T(C): 36.2 (24 Oct 2021 05:36), Max: 36.8 (23 Oct 2021 22:06)  T(F): 97.1 (24 Oct 2021 05:36), Max: 98.3 (23 Oct 2021 22:06)  HR: 92 (24 Oct 2021 05:36) (85 - 92)  BP: 133/61 (24 Oct 2021 05:36) (103/51 - 133/61)  BP(mean): --  RR: 18 (24 Oct 2021 05:36) (18 - 19)  SpO2: 98% (23 Oct 2021 22:06) (92% - 98%)    Physical exam:   constitutional NAD, AAOX3, Respiratory  lungs CTA, CVS heart RRR, GI: abdomen Soft NT, ND, BS+, skin: left hip wound, dressing in place ( by ortho)   neuro exam Motor, sensory and CN normal, no deficit                             10.7   13.21 )-----------( 196      ( 24 Oct 2021 07:33 )             33.3     10-24    136  |  101  |  17  ----------------------------<  114<H>  4.0   |  25  |  0.5<L>    Ca    9.4      24 Oct 2021 07:33  Mg     2.0     10-24    TPro  5.5<L>  /  Alb  3.1<L>  /  TBili  0.7  /  DBili  x   /  AST  33  /  ALT  81<H>  /  AlkPhos  92  10-24      COVID-19 PCR: NotDetec (10-20-21 @ 19:03)          PT/INR - ( 22 Oct 2021 15:50 )   PT: 13.30 sec;   INR: 1.16 ratio               MEDICATIONS  (STANDING):  ascorbic acid 500 milliGRAM(s) Oral two times a day  atorvastatin 40 milliGRAM(s) Oral at bedtime  calcium carbonate 1250 mG  + Vitamin D (OsCal 500 + D) 1 Tablet(s) Oral three times a day  clopidogrel Tablet 75 milliGRAM(s) Oral daily  enoxaparin Injectable 40 milliGRAM(s) SubCutaneous every 24 hours  folic acid 1 milliGRAM(s) Oral daily  melatonin 5 milliGRAM(s) Oral at bedtime  metoprolol tartrate 25 milliGRAM(s) Oral two times a day  multivitamin 1 Tablet(s) Oral daily  pantoprazole    Tablet 40 milliGRAM(s) Oral before breakfast  senna 2 Tablet(s) Oral at bedtime    MEDICATIONS  (PRN):  bisacodyl Suppository 10 milliGRAM(s) Rectal once PRN Constipation  ondansetron Injectable 4 milliGRAM(s) IV Push every 6 hours PRN Nausea and/or Vomiting  oxyCODONE    IR 5 milliGRAM(s) Oral every 3 hours PRN Moderate Pain (4 - 6)  oxyCODONE    IR 10 milliGRAM(s) Oral every 3 hours PRN Severe Pain (7 - 10)      PAST MEDICAL & SURGICAL HISTORY:  CAD (coronary artery disease)    HTN (hypertension)    Arthritis    Barretts esophagus    No significant past surgical history    a/p  # hip fx, traumatic, sp arthroplasty 10/22. cont dvt prophylaxis with lovenox.   # cad and HTN cont plavix and metoprolol  # hx of Barretts esophagitis, cont protonix     #Progress Note Handoff  Pending (specify):  discharge planning   Family discussion: dw pt   Disposition: from home, plan is snf, poss dc in 24 hrs, pt is medically stable for dc   physiatry notes appreciated.

## 2021-10-25 ENCOUNTER — TRANSCRIPTION ENCOUNTER (OUTPATIENT)
Age: 86
End: 2021-10-25

## 2021-10-25 VITALS — HEART RATE: 60 BPM | SYSTOLIC BLOOD PRESSURE: 140 MMHG | TEMPERATURE: 99 F | DIASTOLIC BLOOD PRESSURE: 62 MMHG

## 2021-10-25 LAB — SARS-COV-2 RNA SPEC QL NAA+PROBE: SIGNIFICANT CHANGE UP

## 2021-10-25 PROCEDURE — 99239 HOSP IP/OBS DSCHRG MGMT >30: CPT

## 2021-10-25 PROCEDURE — 93306 TTE W/DOPPLER COMPLETE: CPT | Mod: 26

## 2021-10-25 RX ORDER — FOLIC ACID 0.8 MG
1 TABLET ORAL
Qty: 0 | Refills: 0 | DISCHARGE
Start: 2021-10-25

## 2021-10-25 RX ORDER — SENNA PLUS 8.6 MG/1
2 TABLET ORAL
Qty: 0 | Refills: 0 | DISCHARGE
Start: 2021-10-25

## 2021-10-25 RX ORDER — OXYCODONE HYDROCHLORIDE 5 MG/1
1 TABLET ORAL
Qty: 0 | Refills: 0 | DISCHARGE
Start: 2021-10-25

## 2021-10-25 RX ORDER — ENOXAPARIN SODIUM 100 MG/ML
40 INJECTION SUBCUTANEOUS
Qty: 0 | Refills: 0 | DISCHARGE
Start: 2021-10-25 | End: 2021-11-27

## 2021-10-25 RX ORDER — LANOLIN ALCOHOL/MO/W.PET/CERES
1 CREAM (GRAM) TOPICAL
Qty: 0 | Refills: 0 | DISCHARGE
Start: 2021-10-25

## 2021-10-25 RX ADMIN — CLOPIDOGREL BISULFATE 75 MILLIGRAM(S): 75 TABLET, FILM COATED ORAL at 11:37

## 2021-10-25 RX ADMIN — OXYCODONE HYDROCHLORIDE 10 MILLIGRAM(S): 5 TABLET ORAL at 21:32

## 2021-10-25 RX ADMIN — PANTOPRAZOLE SODIUM 40 MILLIGRAM(S): 20 TABLET, DELAYED RELEASE ORAL at 06:06

## 2021-10-25 RX ADMIN — Medication 1 TABLET(S): at 21:28

## 2021-10-25 RX ADMIN — Medication 500 MILLIGRAM(S): at 05:21

## 2021-10-25 RX ADMIN — Medication 1 TABLET(S): at 11:37

## 2021-10-25 RX ADMIN — OXYCODONE HYDROCHLORIDE 5 MILLIGRAM(S): 5 TABLET ORAL at 18:20

## 2021-10-25 RX ADMIN — OXYCODONE HYDROCHLORIDE 10 MILLIGRAM(S): 5 TABLET ORAL at 09:30

## 2021-10-25 RX ADMIN — Medication 1 MILLIGRAM(S): at 11:37

## 2021-10-25 RX ADMIN — Medication 1 TABLET(S): at 17:41

## 2021-10-25 RX ADMIN — SENNA PLUS 2 TABLET(S): 8.6 TABLET ORAL at 21:28

## 2021-10-25 RX ADMIN — Medication 25 MILLIGRAM(S): at 17:43

## 2021-10-25 RX ADMIN — Medication 500 MILLIGRAM(S): at 17:43

## 2021-10-25 RX ADMIN — ENOXAPARIN SODIUM 40 MILLIGRAM(S): 100 INJECTION SUBCUTANEOUS at 17:44

## 2021-10-25 RX ADMIN — Medication 1 TABLET(S): at 05:21

## 2021-10-25 RX ADMIN — Medication 5 MILLIGRAM(S): at 21:28

## 2021-10-25 RX ADMIN — ATORVASTATIN CALCIUM 40 MILLIGRAM(S): 80 TABLET, FILM COATED ORAL at 21:28

## 2021-10-25 RX ADMIN — Medication 25 MILLIGRAM(S): at 05:21

## 2021-10-25 RX ADMIN — OXYCODONE HYDROCHLORIDE 10 MILLIGRAM(S): 5 TABLET ORAL at 18:09

## 2021-10-25 RX ADMIN — OXYCODONE HYDROCHLORIDE 10 MILLIGRAM(S): 5 TABLET ORAL at 09:21

## 2021-10-25 NOTE — PROGRESS NOTE ADULT - PROVIDER SPECIALTY LIST ADULT
Cardiology
Hospitalist
Orthopedics
Hospitalist
Hospitalist
Internal Medicine
Hospitalist
Internal Medicine
Orthopedics

## 2021-10-25 NOTE — PROGRESS NOTE ADULT - SUBJECTIVE AND OBJECTIVE BOX
HPI  Patient is a 87y old Female who presents with a chief complaint of Fall (25 Oct 2021 10:24)    Currently admitted to medicine with the primary diagnosis of Hip fracture, left       Today is hospital day 5d.     INTERVAL HPI / OVERNIGHT EVENTS:  Patient was examined and seen at bedside. This morning she is resting comfortably in bed and reports no new issues or overnight events.     ROS: Otherwise unremarkable     PAST MEDICAL & SURGICAL HISTORY  CAD (coronary artery disease)    HTN (hypertension)    Arthritis    Barretts esophagus    No significant past surgical history      ALLERGIES  penicillin (Hives)    MEDICATIONS  STANDING MEDICATIONS  ascorbic acid 500 milliGRAM(s) Oral two times a day  atorvastatin 40 milliGRAM(s) Oral at bedtime  calcium carbonate 1250 mG  + Vitamin D (OsCal 500 + D) 1 Tablet(s) Oral three times a day  clopidogrel Tablet 75 milliGRAM(s) Oral daily  enoxaparin Injectable 40 milliGRAM(s) SubCutaneous every 24 hours  folic acid 1 milliGRAM(s) Oral daily  melatonin 5 milliGRAM(s) Oral at bedtime  metoprolol tartrate 25 milliGRAM(s) Oral two times a day  multivitamin 1 Tablet(s) Oral daily  pantoprazole    Tablet 40 milliGRAM(s) Oral before breakfast  senna 2 Tablet(s) Oral at bedtime    PRN MEDICATIONS  bisacodyl Suppository 10 milliGRAM(s) Rectal once PRN  ondansetron Injectable 4 milliGRAM(s) IV Push every 6 hours PRN  oxyCODONE    IR 5 milliGRAM(s) Oral every 3 hours PRN  oxyCODONE    IR 10 milliGRAM(s) Oral every 3 hours PRN    VITALS:  T(F): 97.6  HR: 86  BP: 109/52  RR: 18  SpO2: 98%    PHYSICAL EXAM  GEN: NAD, Resting comfortably in bed  PULM: Clear to auscultation bilaterally, No wheezes  CVS: Regular rate and rhythm, S1-S2, no murmurs  ABD: Soft, non-tender, non-distended, no guarding  EXT: left hip replacement, no overt bleeding or bruises  NEURO: A&Ox3    LABS                        10.7   13.21 )-----------( 196      ( 24 Oct 2021 07:33 )             33.3     10-24    136  |  101  |  17  ----------------------------<  114<H>  4.0   |  25  |  0.5<L>    Ca    9.4      24 Oct 2021 07:33  Mg     2.0     10-24    TPro  5.5<L>  /  Alb  3.1<L>  /  TBili  0.7  /  DBili  x   /  AST  33  /  ALT  81<H>  /  AlkPhos  92  10-24                  RADIOLOGY    EXAM:  XR PELVIS AP ONLY 1-2 VIEWS            PROCEDURE DATE:  10/22/2021            INTERPRETATION:  CLINICAL HISTORY: Postoperative.    COMPARISON: 10/20/2021.    VIEWS: 2 frontal views of the pelvis.    FINDINGS:    Osteopenia. Status post left hip hemiarthroplasty. Alignment is preserved. No radiographic evidence of hardware complication or failure. Subcutaneous gas and skin staples consistent with recent postoperative state. No inadvertent radiopaque foreign bodies. Degenerative change in bilateral sacroiliac joints.    IMPRESSION:    Status post left hip hemiarthroplasty.

## 2021-10-25 NOTE — DISCHARGE NOTE PROVIDER - CARE PROVIDER_API CALL
Galen Moralez  Internal Medicine  [address] [place] [address]  Phone: [phone]  Fax: [phone]  Follow Up Time: 2 weeks    Galen Dong)  [facility]  [facility] [place] [address]  Phone: [phone]  Fax: [phone]  Follow Up Time: 2 weeks

## 2021-10-25 NOTE — DISCHARGE NOTE PROVIDER - NSDCMRMEDTOKEN_GEN_ALL_CORE_FT
atorvastatin 40 mg oral tablet: 1 tab(s) orally once a day (at bedtime)  clopidogrel 75 mg oral tablet: 1 tab(s) orally once a day  metoprolol tartrate 25 mg oral tablet: 1 tab(s) orally 2 times a day  pantoprazole 40 mg oral delayed release tablet: 1 tab(s) orally once a day (before a meal)   atorvastatin 40 mg oral tablet: 1 tab(s) orally once a day (at bedtime)  bisacodyl 10 mg rectal suppository: 1 suppository(ies) rectal once, As needed, Constipation  calcium-vitamin D 500 mg-5 mcg (200 intl units) oral tablet: 1 tab(s) orally 3 times a day  clopidogrel 75 mg oral tablet: 1 tab(s) orally once a day  enoxaparin: 40 milligram(s) subcutaneous once a day  folic acid 1 mg oral tablet: 1 tab(s) orally once a day  melatonin 5 mg oral tablet: 1 tab(s) orally once a day (at bedtime)  metoprolol tartrate 25 mg oral tablet: 1 tab(s) orally 2 times a day  Multiple Vitamins oral tablet: 1 tab(s) orally once a day  oxyCODONE 5 mg oral tablet: 1 tab(s) orally every 3 hours, As needed, Moderate Pain (4 - 6)  pantoprazole 40 mg oral delayed release tablet: 1 tab(s) orally once a day (before a meal)  senna oral tablet: 2 tab(s) orally once a day (at bedtime)

## 2021-10-25 NOTE — DISCHARGE NOTE PROVIDER - HOSPITAL COURSE
88 YO F with PMHx of yolette esophagus, CAD s/p 2 stents in 2010 (on plavix), BPPV presented to the ED with mechanical fall.  Collateral history obtained from son at bedside. Patient states that she attempted to turn and fell on L hip onto the concrete. Denies any head injury or LOC. Not on any anticoagulation.  Patient denies fevers, chills, headache, cough, shortness of breath, chest pain, palpitations, abdominal pain, nausea, vomiting, diarrhea, constipation, melena, hematochezia, dysuria, urinary frequency or urgency, numbness, tingling, recent travel or any known sick contact.   In the ED VS were unremarkable. Trauma workup showed L femoral neck fracture     # hip fx, traumatic, sp arthroplasty 10/22. cont dvt prophylaxis with lovenox.   # cad and HTN cont plavix and metoprolol  # hx of Barretts esophagitis, cont protonix    88 YO F with PMHx of yolette esophagus, CAD s/p 2 stents in 2010 (on plavix), BPPV presented to the ED with mechanical fall.  Collateral history obtained from son at bedside. Patient states that she attempted to turn and fell on L hip onto the concrete. Denies any head injury or LOC. Not on any anticoagulation.  Patient denies fevers, chills, headache, cough, shortness of breath, chest pain, palpitations, abdominal pain, nausea, vomiting, diarrhea, constipation, melena, hematochezia, dysuria, urinary frequency or urgency, numbness, tingling, recent travel or any known sick contact.   In the ED VS were unremarkable. Trauma workup showed L femoral neck fracture s/p Left [place]-arthroplasty 10/22. PT consulted, likely d/c to SNF    Patient is medically stable and safe

## 2021-10-25 NOTE — PROGRESS NOTE ADULT - SUBJECTIVE AND OBJECTIVE BOX
pt seen and examined.   today she has some hip pain ( has not received her pain meds yet)       HPI:  86 YO F with PMHx of yolette esophagus, CAD s/p 2 stents in 2010 (on plavix), BPPV presented to the ED with mechanical fall.  Collateral history obtained from son at bedside. Patient states that she attempted to turn and fell on L hip onto the concrete. Denies any head injury or LOC. Not on any anticoagulation.  Patient denies fevers, chills, headache, cough, shortness of breath, chest pain, palpitations, abdominal pain, nausea, vomiting, diarrhea, constipation, melena, hematochezia, dysuria, urinary frequency or urgency, numbness, tingling, recent travel or any known sick contact.   In the ED VS were unremarkable. Trauma workup showed L femoral neck fracture  (21 Oct 2021 00:37)      ROS: no cp, no sob, no n/v, no fever    Vital Signs Last 24 Hrs  T(C): 36.1 (25 Oct 2021 05:41), Max: 37.3 (24 Oct 2021 22:06)  T(F): 97 (25 Oct 2021 05:41), Max: 99.1 (24 Oct 2021 22:06)  HR: 83 (25 Oct 2021 05:41) (83 - 91)  BP: 116/54 (25 Oct 2021 05:41) (109/52 - 124/59)  RR: 18 (25 Oct 2021 05:41) (18 - 19)  SpO2: 98% (24 Oct 2021 22:06) (98% - 98%)    Physical exam:   constitutional NAD, AAOX3, Respiratory  lungs CTA, CVS heart RRR, GI: abdomen Soft NT, ND, BS+, skin: hip surgery wound, clean, mildly tender   neuro exam Motor, sensory and CN normal, no deficit                             10.7   13.21 )-----------( 196      ( 24 Oct 2021 07:33 )             33.3     10-24    136  |  101  |  17  ----------------------------<  114<H>  4.0   |  25  |  0.5<L>    Ca    9.4      24 Oct 2021 07:33  Mg     2.0     10-24    TPro  5.5<L>  /  Alb  3.1<L>  /  TBili  0.7  /  DBili  x   /  AST  33  /  ALT  81<H>  /  AlkPhos  92  10-24      COVID-19 PCR: NotDetec (10-20-21 @ 19:03)    MEDICATIONS  (STANDING):  ascorbic acid 500 milliGRAM(s) Oral two times a day  atorvastatin 40 milliGRAM(s) Oral at bedtime  calcium carbonate 1250 mG  + Vitamin D (OsCal 500 + D) 1 Tablet(s) Oral three times a day  clopidogrel Tablet 75 milliGRAM(s) Oral daily  enoxaparin Injectable 40 milliGRAM(s) SubCutaneous every 24 hours  folic acid 1 milliGRAM(s) Oral daily  melatonin 5 milliGRAM(s) Oral at bedtime  metoprolol tartrate 25 milliGRAM(s) Oral two times a day  multivitamin 1 Tablet(s) Oral daily  pantoprazole    Tablet 40 milliGRAM(s) Oral before breakfast  senna 2 Tablet(s) Oral at bedtime    MEDICATIONS  (PRN):  bisacodyl Suppository 10 milliGRAM(s) Rectal once PRN Constipation  ondansetron Injectable 4 milliGRAM(s) IV Push every 6 hours PRN Nausea and/or Vomiting  oxyCODONE    IR 5 milliGRAM(s) Oral every 3 hours PRN Moderate Pain (4 - 6)  oxyCODONE    IR 10 milliGRAM(s) Oral every 3 hours PRN Severe Pain (7 - 10)      PAST MEDICAL & SURGICAL HISTORY:  CAD (coronary artery disease)  HTN (hypertension)  Arthritis  Barretts esophagus    a/p  # hip fracture, due to mechanical fall, sp surgery 10/22,   # # cad and HTN cont plavix and metoprolol  # hx of Barretts esophagitis, cont protonix     #Progress Note Handoff  Pending (specify):  discharge planning   Family discussion: dw pt   Disposition: from home, plan is snf, poss dc in 24 hrs, pt is medically stable for dc   physiatry notes appreciated.     time spent 35 min

## 2021-10-25 NOTE — DISCHARGE NOTE PROVIDER - PROVIDER TOKENS
PROVIDER:[TOKEN:[[id number]],FOLLOWUP:[2 weeks]],PROVIDER:[TOKEN:[[id number]],FOLLOWUP:[2 weeks]]

## 2021-10-25 NOTE — DISCHARGE NOTE NURSING/CASE MANAGEMENT/SOCIAL WORK - NSDCPEFALRISK_GEN_ALL_CORE
For information on Fall & injury Prevention, visit [email or web address]

## 2021-10-25 NOTE — PROGRESS NOTE ADULT - ASSESSMENT
Orthopaedic Surgery Progress Note    SUBJECTIVE  No acute events overnight.   Pt was seen and examined by the orthopedic surgery team during morning rounds. Doing well. Normal Vitals. Tolerating diet. Making adequate urine.   Denies n/v, abdominal pain, diarrhea or any other major complaints.    MEDS  MEDICATIONS  (STANDING):  ascorbic acid 500 milliGRAM(s) Oral two times a day  atorvastatin 40 milliGRAM(s) Oral at bedtime  calcium carbonate 1250 mG  + Vitamin D (OsCal 500 + D) 1 Tablet(s) Oral three times a day  celecoxib 200 milliGRAM(s) Oral every 12 hours  enoxaparin Injectable 40 milliGRAM(s) SubCutaneous every 24 hours  folic acid 1 milliGRAM(s) Oral daily  melatonin 5 milliGRAM(s) Oral at bedtime  metoprolol tartrate 25 milliGRAM(s) Oral two times a day  multivitamin 1 Tablet(s) Oral daily  pantoprazole    Tablet 40 milliGRAM(s) Oral before breakfast  senna 2 Tablet(s) Oral at bedtime    MEDICATIONS  (PRN):  ondansetron Injectable 4 milliGRAM(s) IV Push every 6 hours PRN Nausea and/or Vomiting  oxyCODONE    IR 5 milliGRAM(s) Oral every 3 hours PRN Moderate Pain (4 - 6)  oxyCODONE    IR 10 milliGRAM(s) Oral every 3 hours PRN Severe Pain (7 - 10)    --------------------------------------    T(C): 36.2 (10-23-21 @ 05:17), Max: 37 (10-22-21 @ 14:30)  HR: 72 (10-23-21 @ 05:17) (72 - 104)  BP: 136/72 (10-23-21 @ 05:17) (121/55 - 165/80)  RR: 18 (10-23-21 @ 05:17) (10 - 25)  SpO2: 97% (10-22-21 @ 20:00) (93% - 99%)    P/E:  AOx3, NAD  Nonlabored breathing    LLE  Dressing in place, c/d/i  SILT sp/dp/t/sural/saph  Firing ta/ehl/fhl/gs  Foot WWP, 2+ DP pulse      --------------------------------------    Labs                        13.6   16.93 )-----------( 219      ( 22 Oct 2021 15:50 )             42.5     10-22    142  |  107  |  13  ----------------------------<  152<H>  4.2   |  19  |  0.6<L>    Ca    9.0      22 Oct 2021 15:50  Mg     2.2     10-22    TPro  6.3  /  Alb  3.8  /  TBili  0.8  /  DBili  x   /  AST  177<H>  /  ALT  271<H>  /  AlkPhos  109  10-22    LIVER FUNCTIONS - ( 22 Oct 2021 07:54 )  Alb: 3.8 g/dL / Pro: 6.3 g/dL / ALK PHOS: 109 U/L / ALT: 271 U/L / AST: 177 U/L / GGT: x           PT/INR - ( 22 Oct 2021 15:50 )   PT: 13.30 sec;   INR: 1.16 ratio             --------------------------------------    A/P:   86yo Female  s/p L HIP CINDY-ARTHROPLASTY on 10/22/21, POD#1.     Weightbearing status: WBAT LLE  - OOBTC twice per day w/ assistance  Pain control: per protocol  DVT ppx: scd's + LVX  Abx: ANCEF  -chart note stating patient refused abx  -discussed with patient the importance of postop abx ppx     Labs/Drains: trend H/H  - Repeat labs in AM  Dressing changes: to be performed by the orthopaedic surgery service  Home Meds: please reinstate as appropriate    Consults:   - Pending PT/OT/rehab    --------------------------------------    Disposition:   Home vs. subacute rehab vs. acute rehab  [] pending PT evaluation  --------------------------------------            
1] S/P Left Hip Hemiarthroplasty for Left Femoral Neck Fracture  - Post op care per Orthopedic Team  - No post op cardiac event.      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  Recall PRN.  Patient will follow up with Dr. Adrián Dudley.    Brendon Drummond MD (covering for Dr. Adrián Dudley)  [phone] Office
  ORTHOPAEDIC SURGERY   POST-OP CHECK NOTE    Patient Name: ALMA MIGUEL  Operation: LEFT HIP CINDY-ARTHROPLASTY  Surgeon:  YSABEL QUESADA  ---------------------------------------  SUBJECTIVE    87y Female seen and examined approximately 4 hours after above procedure. Patient is resting comfortably in no acute distress. Denies f/c/cp/sob/n/v/d.    ---------------------------------------  P/E:  T(F): 97.1 (10-23-21 @ 05:17), Max: 98.6 (10-22-21 @ 14:30)  HR: 72 (10-23-21 @ 05:17) (72 - 104)  BP: 136/72 (10-23-21 @ 05:17) (121/55 - 165/80)  RR: 18 (10-23-21 @ 05:17) (10 - 25)    General: awake, alert, resting comfortably in NAD  Cardio: RRR per peripheral pulses  Respiratory: breathing quietly without use of accessory muscles     LLE  Dressing in place, c/d/i  SILT sp/dp/t/sural/saph  Firing ta/ehl/fhl/gs  Foot WWP, 2+ DP pulse  ---------------------------------------  LABS                        13.6   16.93 )-----------( 219      ( 22 Oct 2021 15:50 )             42.5       10-22    142  |  107  |  13  ----------------------------<  152<H>  4.2   |  19  |  0.6<L>    Ca    9.0      22 Oct 2021 15:50  Mg     2.2     10-22    TPro  6.3  /  Alb  3.8  /  TBili  0.8  /  DBili  x   /  AST  177<H>  /  ALT  271<H>  /  AlkPhos  109  10-22    LIVER FUNCTIONS - ( 22 Oct 2021 07:54 )  Alb: 3.8 g/dL / Pro: 6.3 g/dL / ALK PHOS: 109 U/L / ALT: 271 U/L / AST: 177 U/L / GGT: x           PT/INR - ( 22 Oct 2021 15:50 )   PT: 13.30 sec;   INR: 1.16 ratio               COVID-19 PCR: NotDetec (20 Oct 2021 19:03)      MEDS  MEDICATIONS  (STANDING):  ascorbic acid 500 milliGRAM(s) Oral two times a day  atorvastatin 40 milliGRAM(s) Oral at bedtime  calcium carbonate 1250 mG  + Vitamin D (OsCal 500 + D) 1 Tablet(s) Oral three times a day  celecoxib 200 milliGRAM(s) Oral every 12 hours  enoxaparin Injectable 40 milliGRAM(s) SubCutaneous every 24 hours  folic acid 1 milliGRAM(s) Oral daily  melatonin 5 milliGRAM(s) Oral at bedtime  metoprolol tartrate 25 milliGRAM(s) Oral two times a day  multivitamin 1 Tablet(s) Oral daily  pantoprazole    Tablet 40 milliGRAM(s) Oral before breakfast  senna 2 Tablet(s) Oral at bedtime    MEDICATIONS  (PRN):  ondansetron Injectable 4 milliGRAM(s) IV Push every 6 hours PRN Nausea and/or Vomiting  oxyCODONE    IR 5 milliGRAM(s) Oral every 3 hours PRN Moderate Pain (4 - 6)  oxyCODONE    IR 10 milliGRAM(s) Oral every 3 hours PRN Severe Pain (7 - 10)    ---------------------------------------    ASSESSMENT  87y Female s/p L HIP CINDY-ARTHROPLASTY on 10/22/21, seen approximately 4 hours, after surgery, in stable condition.    PLAN:   - follow-up post-op labs  - Sawyer out POD1, follow-up TOV  - Activity:  WBAT LLE  - Antibiotics: Ancef 2g q8h for 24h  - Pain: continue care per current regimen  - Prophylaxis: SCDs; pharmacologic prophylaxis to begin at 23h post-oP  - PT    -Disposition:   Return to floor when cleared by anesthesia  Home vs. subacute rehab vs. acute rehab  pending PT evaluation      Please page ORTHO w/ any questions or concerns      
88 YO F with PMHx of yolette esophagus, CAD s/p 2 stents in 2010 (on plavix), BPPV presented to the ED with mechanical fall.    #L femoral neck fracture s/p L hip [place]-arthroplasty  - followed by orthopedics  - working with PT, awaiting placement to SNF      #CAD s/p PCI in 2010  - Restarted on plavix  - Continue Metoprolol       #DVT Prophylaxis: Lovenox   #GI Prophylaxis: Pantoprazole   #Awaiting placement to SNF

## 2021-10-25 NOTE — DISCHARGE NOTE PROVIDER - CARE PROVIDERS DIRECT ADDRESSES
[email or web address]

## 2021-10-25 NOTE — DISCHARGE NOTE NURSING/CASE MANAGEMENT/SOCIAL WORK - PATIENT PORTAL LINK FT
You can access the FollowMyHealth Patient Portal offered by [facility] by registering at the following website: [email or web address]. By joining [facility]’s FollowMyHealth portal, you will also be able to view your health information using other applications (apps) compatible with our system.

## 2021-10-27 LAB — SURGICAL PATHOLOGY STUDY: SIGNIFICANT CHANGE UP

## 2021-11-08 DIAGNOSIS — Z79.02 LONG TERM (CURRENT) USE OF ANTITHROMBOTICS/ANTIPLATELETS: ICD-10-CM

## 2021-11-08 DIAGNOSIS — I25.2 OLD MYOCARDIAL INFARCTION: ICD-10-CM

## 2021-11-08 DIAGNOSIS — K22.70 BARRETT'S ESOPHAGUS WITHOUT DYSPLASIA: ICD-10-CM

## 2021-11-08 DIAGNOSIS — Z88.0 ALLERGY STATUS TO PENICILLIN: ICD-10-CM
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
## 2021-11-08 DIAGNOSIS — Z95.5 PRESENCE OF CORONARY ANGIOPLASTY IMPLANT AND GRAFT: ICD-10-CM

## 2021-11-08 DIAGNOSIS — S72.002A FRACTURE OF UNSPECIFIED PART OF NECK OF LEFT FEMUR, INITIAL ENCOUNTER FOR CLOSED FRACTURE: ICD-10-CM
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
## 2021-11-08 DIAGNOSIS — K44.9 DIAPHRAGMATIC HERNIA WITHOUT OBSTRUCTION OR GANGRENE: ICD-10-CM

## 2021-11-08 DIAGNOSIS — I10 ESSENTIAL (PRIMARY) HYPERTENSION: ICD-10-CM

## 2021-11-08 DIAGNOSIS — H81.10 BENIGN PAROXYSMAL VERTIGO, UNSPECIFIED EAR: ICD-10-CM

## 2021-11-08 DIAGNOSIS — E78.5 HYPERLIPIDEMIA, UNSPECIFIED: ICD-10-CM

## 2021-11-08 DIAGNOSIS — I25.10 ATHEROSCLEROTIC HEART DISEASE OF NATIVE CORONARY ARTERY WITHOUT ANGINA PECTORIS: ICD-10-CM

## 2022-05-25 NOTE — PATIENT PROFILE ADULT - FALL HARM RISK CONCLUSION
Pt calling, started a new job and cannot make her first/transfer appt in June in person, so switched to video visit.   We did schedule the 8 week follow up now for 08-01-22 in person and patient is aware that one needs to be kept as in person.       Fall with Harm Risk

## 2023-01-06 NOTE — PATIENT PROFILE ADULT - IS THERE A SUSPICION OF ABUSE/NEGLIGENCE?
[address]        Pt Name: Sarah Bland  MRN: [id number]  [place] 1950  Date of evaluation: 1/5/2023  Provider: Aureliano Mario DO  PCP: Gerald Abdul MD  Note Started: 2:45 AM EST 1/6/23    CHIEF COMPLAINT       Chief Complaint   Patient presents with    Shortness of Breath     Low pulse ox, 89% RA. Congestion x 4 days. Cough. wheezing       HISTORY OF PRESENT ILLNESS: 1 or more Elements   History From: patient    Limitations to history : None    Sarah Bland is a 67 y.o. female who presents to the emergency department complaining of shortness of breath. The patient symptoms were sudden onset 4 days ago, persistent, moderate in severity, nothing makes it better or worse. She states that her cough has been dry and nonproductive. She was slightly wheezing at times. She states that her pulse ox was low 89% on room air. She does have nasal cannula oxygen at home, 4 L which she is wearing as needed. She has been experiencing nasal congestion as well. She states that the oxygen that she has been is supposed to only be used as needed and she typically does not wear it. She states that she called her PCP requesting continuous oxygen prescription and due to her low oxygen level she needed to come to the emergency department for further evaluation. She denies any lightheadedness, dizziness, syncope, chest pain, palpitations, numbness, tingling, unilateral weakness, abdominal pain, nausea, vomiting, diarrhea, recent hospitalization or illness, or other acute symptoms or concerns. Nursing Notes were all reviewed and agreed with or any disagreements were addressed in the HPI. REVIEW OF SYSTEMS :      Review of Systems    Positives and Pertinent negatives as per HPI.      SURGICAL HISTORY     Past Surgical History:   Procedure Laterality Date    AORTIC VALVE REPLACEMENT      CARDIAC SURGERY \"homograph\" valve replacement    COLONOSCOPY      ENDOSCOPY, COLON, DIAGNOSTIC      GASTROSTOMY TUBE PLACEMENT  2016    THORACENTESIS  10/14/2016    VENA CAVA FILTER PLACEMENT Right 10/14/2016       CURRENTMEDICATIONS       Discharge Medication List as of 2023  4:00 AM        CONTINUE these medications which have NOT CHANGED    Details   losartan (COZAAR) 25 MG tablet Take 12.5 mg by mouth dailyHistorical Med      furosemide (LASIX) 40 MG tablet take 1 tablet by mouth nightly, Disp-30 tablet, R-2Normal      nystatin (MYCOSTATIN) 223340 UNIT/GM powder Apply topically 3 times daily as needed (rash), Topical, 3 TIMES DAILY PRN Starting Wed 10/5/2022, Disp-60 g, R-1, Normal      Ergocalciferol (DRISDOL) 200 MCG/ML drops Take 0.5 mLs by mouth in the morning., Disp-10 mL, R-0Labeling may look different. 200 mcg=8000 Units. Please double check dosages. Normal      atorvastatin (LIPITOR) 40 MG tablet Take 40 mg by mouth nightlyHistorical Med      OXYGEN Inhale 1 L/min into the lungs as needed (WHEEZING/SHORTNESS OF BREATH)Historical Med      Multiple Vitamins-Minerals (THERAPEUTIC MULTIVITAMIN-MINERALS) tablet Take 1 tablet by mouth in the morning. Historical Med      polyvinyl alcohol (LIQUIFILM TEARS) 1.4 % ophthalmic solution Place 1 drop into both eyes as needed for Dry EyesHistorical Med             ALLERGIES     Protamine, Wasp venom, Zosyn [piperacillin sod-tazobactam so], Ciprofloxacin, Hydrocodone-acetaminophen, Oxycodone-acetaminophen, Piperacillin-tazobactam in dex, Sweet potato, and Vancomycin    FAMILYHISTORY       Family History   Problem Relation Age of Onset    Cancer Father         dies age 71 of bowel obstruction.  h/o enlarged heart    Cancer Sister         breast cancer  at 39    Diabetes Sister     Other Brother         kidney failure    Heart Failure Brother     Cancer Maternal Grandmother         colon    Cancer Paternal Grandmother         SOCIAL HISTORY       Social History Tobacco Use    Smoking status: Never    Smokeless tobacco: Never   Substance Use Topics    Alcohol use: No    Drug use: No       SCREENINGS        [place] Coma Scale  Eye Opening: Spontaneous  Best Verbal Response: Oriented  Best Motor Response: Obeys commands  [place] Coma Scale Score: 15                CIWA Assessment  BP: (!) 168/85  Heart Rate: 79           PHYSICAL EXAM  1 or more Elements     ED Triage Vitals [01/05/23 1936]   BP Temp Temp Source Heart Rate Resp SpO2 Height Weight   (!) 168/85 98.4 °F (36.9 °C) Oral 79 16 96 % 5' 4\" (1.626 m) 217 lb (98.4 kg)       Physical Exam    Constitutional/General: Alert and oriented x3  Head: Normocephalic and atraumatic  Eyes: PERRL, EOMI, conjunctiva normal, sclera non icteric  ENT:  Oropharynx clear, handling secretions, no trismus, no asymmetry of the posterior oropharynx or uvular edema  Neck: Supple, full ROM, no stridor, no meningeal signs  Respiratory: Lungs clear to auscultation bilaterally, no wheezes, rales, or rhonchi. Not in respiratory distress. Coarse cough throughout. Lungs are slightly diminished bilaterally. There is no wheezing or rales or rhonchi  Cardiovascular:  Regular rate. Regular rhythm. No murmurs, no gallops, no rubs. 2+ distal pulses. Equal extremity pulses. Chest: No chest wall tenderness  GI:  Abdomen Soft, Non tender, Non distended. +BS. No rebound, guarding, or rigidity. No pulsatile masses. Musculoskeletal: Moves all extremities x 4. Warm and well perfused, no clubbing, no cyanosis, no edema. Capillary refill <3 seconds  Integument: skin warm and dry. No rashes.    Neurologic: GCS 15, no focal deficits, symmetric strength 5/5 in the upper and lower extremities bilaterally  Psychiatric: Normal Affect            DIAGNOSTIC RESULTS   LABS:    Labs Reviewed   CBC WITH AUTO DIFFERENTIAL - Abnormal; Notable for the following components:       Result Value    Eosinophils % 7.5 (*)     All other components within normal limits COMPREHENSIVE METABOLIC PANEL W/ REFLEX TO MG FOR LOW K - Abnormal; Notable for the following components:    Glucose 106 (*)     BUN 30 (*)     Creatinine 1.6 (*)     All other components within normal limits   TROPONIN - Abnormal; Notable for the following components:    Troponin, High Sensitivity 21 (*)     All other components within normal limits   BRAIN NATRIURETIC PEPTIDE - Abnormal; Notable for the following components:    Pro- (*)     All other components within normal limits   TROPONIN - Abnormal; Notable for the following components:    Troponin, High Sensitivity 20 (*)     All other components within normal limits   RAPID INFLUENZA A/B ANTIGENS   COVID-19, RAPID       As interpreted by me, the above displayed labs are abnormal. All other labs obtained during this visit were within normal range or not returned as of this dictation. RADIOLOGY:   Non-plain film images such as CT, Ultrasound and MRI are read by the radiologist.     Interpretation per the Radiologist below, if available at the time of this note:    XR CHEST (2 VW)   Final Result   Status post previous open heart surgery with mild increased markings the   consistent with fibrosis and the superimposed mild interstitial pulmonary   edema. XR CHEST (2 VW)    Result Date: 1/5/2023  EXAMINATION: TWO XRAY VIEWS OF THE CHEST 1/5/2023 8:12 pm COMPARISON: 28 July 2022 HISTORY: ORDERING SYSTEM PROVIDED HISTORY: shortness of breath TECHNOLOGIST PROVIDED HISTORY: Reason for exam:->shortness of breath FINDINGS: Frontal and lateral views of the chest demonstrate prominent interstitial markings with prominent pulmonary vascularity. The patient is status post previous open heart surgery with small pleural effusions. Status post previous open heart surgery with mild increased markings the consistent with fibrosis and the superimposed mild interstitial pulmonary edema. No results found.     PROCEDURES   Unless otherwise noted below, none     Procedures    CRITICAL CARE TIME (.cct)   none    PAST MEDICAL HISTORY/Chronic Conditions Affecting Care      has a past medical history of A-fib ([facility] ARF (acute renal failure) ([facility] Cardiac dysfunction, Diabetes mellitus [facility] Diverticulitis, History of deep vein thrombosis (11/03/2016), blood clots, Poor venous access (11/04/2016), S/P AVR, S/P IVC filter (11/03/2016), Stage 3b chronic kidney disease [facility] Traumatic seroma of left thigh ([facility] (11/16/2016), and Ventilator dependent ([facility] EMERGENCY DEPARTMENT COURSE    Vitals:    Vitals:    01/05/23 1936   BP: (!) 168/85   Pulse: 79   Resp: 16   Temp: 98.4 °F (36.9 °C)   TempSrc: Oral   SpO2: 96%   Weight: 217 lb (98.4 kg)   Height: 5' 4\" (1.626 m)       Patient was given the following medications:  Medications   ipratropium-albuterol (DUONEB) nebulizer solution 1 ampule (1 ampule Inhalation Given 1/6/23 0308)   methylPREDNISolone sodium (SOLU-MEDROL) injection 125 mg (125 mg IntraVENous Given 1/6/23 0259)       ED Course as of 01/07/23 0717   Fri Jan 06, 2023 0243 EKG: This EKG is signed and interpreted by me. Rate: 76  Rhythm: Sinus  Interpretation: Sinus tachycardia with short MA, right axis deviation, right bundle branch block, nonspecific ST changes throughout, QT is prolonged, QTC is 504  Comparison: no previous EKG available    [KG]   0355 Patient is feeling better. She is on her home nasal cannula oxygen. She is in no acute distress. Wheezing has improved. She is not hypoxic. She will be discharged home. [KG]      ED Course User Index  [KG] Alexa Mcghee,         Is this patient to be included in the SEP-1 Core Measure due to severe sepsis or septic shock? No Exclusion criteria - the patient is NOT to be included for SEP-1 Core Measure due to:  Infection is not suspected        Medical Decision Making/Differential Diagnosis:    CC/HPI Summary, Social Determinants of health, Records Reviewed, DDx, testing done/not done, ED Course, Reassessment, disposition considerations/shared decision making with patient, consults, disposition:        The patient is a 80-year-old female who presents to the emergency department complaining of shortness of breath. The patient is hemodynamically stable, nontoxic, and in no acute distress. There are no social detriments of health. Prior records were reviewed and not pertinent to current visit. Differential diagnosis includes but is not limited to pneumonia versus COVID versus influenza versus other viral etiology. Patient was treated with DuoNeb breathing treatment and 125 mg of Solu-Medrol IV. The patient's creatinine is 1.6 and baseline is 1.4. EKG does not show acute ischemia. Delta troponins are negative. No leukocytosis. Influenza and Covid negative. Patient remained hemodynamically stable on her home oxygen. Patient was discharged home with close follow-up. Did use shared decision making and she states that she wants to go home on her home oxygen and follow-up with her family doctor. Patient is to return here if she experienced any worsening symptoms or other acute symptoms or concerns. Patient verbalized understanding agreement to treatment plan discharge instructions. CONSULTS: (Who and What was discussed)  None        I am the Primary Clinician of Record. FINAL IMPRESSION      1.  Acute upper respiratory infection          DISPOSITION/PLAN     DISPOSITION Decision To Discharge 01/06/2023 03:56:43 AM      PATIENT REFERRED TO:  Junior Julio MD  [facility] [id number]  [phone]    Schedule an appointment as soon as possible for a visit       DISCHARGE MEDICATIONS:  Discharge Medication List as of 1/6/2023  4:00 AM        START taking these medications    Details   albuterol (PROVENTIL) (5 MG/ML) 0.5% nebulizer solution Take 0.5 mLs by nebulization every 6 hours as needed for Wheezing, Disp-120 each, R-0Normal      albuterol sulfate HFA (VENTOLIN HFA) 108 (90 Base) MCG/ACT inhaler Inhale 2 puffs into the lungs 4 times daily as needed for Wheezing, Disp-54 g, R-1Normal      predniSONE (DELTASONE) 20 MG tablet Take 2.5 tablets by mouth daily for 5 days, Disp-13 tablet, R-0Normal      doxycycline hyclate (VIBRA-TABS) 100 MG tablet Take 1 tablet by mouth 2 times daily for 7 days, Disp-14 tablet, R-0Normal             DISCONTINUED MEDICATIONS:  Discharge Medication List as of 1/6/2023  4:00 AM                 (Please note that portions of this note were completed with a voice recognition program.  Efforts were made to edit the dictations but occasionally words are mis-transcribed.)    Alistair Wheeler DO (electronically signed)           Alistair Wheeler DO  01/07/23 9146 no

## 2023-01-27 ENCOUNTER — OUTPATIENT (OUTPATIENT)
Dept: OUTPATIENT SERVICES | Facility: HOSPITAL | Age: 88
LOS: 1 days | End: 2023-01-27
Payer: MEDICARE

## 2023-01-27 ENCOUNTER — APPOINTMENT (OUTPATIENT)
Dept: RADIOLOGY | Facility: HOSPITAL | Age: 88
End: 2023-01-27
Payer: MEDICARE

## 2023-01-27 PROCEDURE — 74240 X-RAY XM UPR GI TRC 1CNTRST: CPT | Mod: 26

## 2023-01-27 PROCEDURE — 74220 X-RAY XM ESOPHAGUS 1CNTRST: CPT

## 2023-01-27 PROCEDURE — 74240 X-RAY XM UPR GI TRC 1CNTRST: CPT

## 2024-04-15 ENCOUNTER — INPATIENT (INPATIENT)
Facility: HOSPITAL | Age: 89
LOS: 1 days | Discharge: NURSING, REHAB OR LONG-TERM CARE | DRG: 552 | End: 2024-04-17
Attending: INTERNAL MEDICINE | Admitting: INTERNAL MEDICINE
Payer: MEDICARE

## 2024-04-15 VITALS
RESPIRATION RATE: 20 BRPM | SYSTOLIC BLOOD PRESSURE: 155 MMHG | HEART RATE: 88 BPM | HEIGHT: 62 IN | TEMPERATURE: 98 F | DIASTOLIC BLOOD PRESSURE: 69 MMHG | WEIGHT: 110.01 LBS | OXYGEN SATURATION: 99 %

## 2024-04-15 DIAGNOSIS — M54.9 DORSALGIA, UNSPECIFIED: ICD-10-CM

## 2024-04-15 LAB
ALBUMIN SERPL ELPH-MCNC: 4.2 G/DL — SIGNIFICANT CHANGE UP (ref 3.5–5.2)
ALP SERPL-CCNC: 91 U/L — SIGNIFICANT CHANGE UP (ref 30–115)
ALT FLD-CCNC: 13 U/L — SIGNIFICANT CHANGE UP (ref 0–41)
ANION GAP SERPL CALC-SCNC: 19 MMOL/L — HIGH (ref 7–14)
AST SERPL-CCNC: 20 U/L — SIGNIFICANT CHANGE UP (ref 0–41)
BASOPHILS # BLD AUTO: 0.04 K/UL — SIGNIFICANT CHANGE UP (ref 0–0.2)
BASOPHILS NFR BLD AUTO: 0.3 % — SIGNIFICANT CHANGE UP (ref 0–1)
BILIRUB SERPL-MCNC: 0.6 MG/DL — SIGNIFICANT CHANGE UP (ref 0.2–1.2)
BUN SERPL-MCNC: 12 MG/DL — SIGNIFICANT CHANGE UP (ref 10–20)
CALCIUM SERPL-MCNC: 9.8 MG/DL — SIGNIFICANT CHANGE UP (ref 8.4–10.5)
CHLORIDE SERPL-SCNC: 101 MMOL/L — SIGNIFICANT CHANGE UP (ref 98–110)
CO2 SERPL-SCNC: 20 MMOL/L — SIGNIFICANT CHANGE UP (ref 17–32)
CREAT SERPL-MCNC: 0.6 MG/DL — LOW (ref 0.7–1.5)
EGFR: 85 ML/MIN/1.73M2 — SIGNIFICANT CHANGE UP
EOSINOPHIL # BLD AUTO: 0 K/UL — SIGNIFICANT CHANGE UP (ref 0–0.7)
EOSINOPHIL NFR BLD AUTO: 0 % — SIGNIFICANT CHANGE UP (ref 0–8)
GLUCOSE SERPL-MCNC: 84 MG/DL — SIGNIFICANT CHANGE UP (ref 70–99)
HCT VFR BLD CALC: 40.5 % — SIGNIFICANT CHANGE UP (ref 37–47)
HGB BLD-MCNC: 13.8 G/DL — SIGNIFICANT CHANGE UP (ref 12–16)
IMM GRANULOCYTES NFR BLD AUTO: 0.4 % — HIGH (ref 0.1–0.3)
LYMPHOCYTES # BLD AUTO: 1.97 K/UL — SIGNIFICANT CHANGE UP (ref 1.2–3.4)
LYMPHOCYTES # BLD AUTO: 17 % — LOW (ref 20.5–51.1)
MCHC RBC-ENTMCNC: 30.3 PG — SIGNIFICANT CHANGE UP (ref 27–31)
MCHC RBC-ENTMCNC: 34.1 G/DL — SIGNIFICANT CHANGE UP (ref 32–37)
MCV RBC AUTO: 88.8 FL — SIGNIFICANT CHANGE UP (ref 81–99)
MONOCYTES # BLD AUTO: 1.14 K/UL — HIGH (ref 0.1–0.6)
MONOCYTES NFR BLD AUTO: 9.9 % — HIGH (ref 1.7–9.3)
NEUTROPHILS # BLD AUTO: 8.36 K/UL — HIGH (ref 1.4–6.5)
NEUTROPHILS NFR BLD AUTO: 72.4 % — SIGNIFICANT CHANGE UP (ref 42.2–75.2)
NRBC # BLD: 0 /100 WBCS — SIGNIFICANT CHANGE UP (ref 0–0)
PLATELET # BLD AUTO: 312 K/UL — SIGNIFICANT CHANGE UP (ref 130–400)
PMV BLD: 9.2 FL — SIGNIFICANT CHANGE UP (ref 7.4–10.4)
POTASSIUM SERPL-MCNC: 3.8 MMOL/L — SIGNIFICANT CHANGE UP (ref 3.5–5)
POTASSIUM SERPL-SCNC: 3.8 MMOL/L — SIGNIFICANT CHANGE UP (ref 3.5–5)
PROT SERPL-MCNC: 6.2 G/DL — SIGNIFICANT CHANGE UP (ref 6–8)
RBC # BLD: 4.56 M/UL — SIGNIFICANT CHANGE UP (ref 4.2–5.4)
RBC # FLD: 13.1 % — SIGNIFICANT CHANGE UP (ref 11.5–14.5)
SODIUM SERPL-SCNC: 140 MMOL/L — SIGNIFICANT CHANGE UP (ref 135–146)
WBC # BLD: 11.56 K/UL — HIGH (ref 4.8–10.8)
WBC # FLD AUTO: 11.56 K/UL — HIGH (ref 4.8–10.8)

## 2024-04-15 PROCEDURE — 99285 EMERGENCY DEPT VISIT HI MDM: CPT

## 2024-04-15 PROCEDURE — 72131 CT LUMBAR SPINE W/O DYE: CPT | Mod: 26,MC

## 2024-04-15 PROCEDURE — 82962 GLUCOSE BLOOD TEST: CPT

## 2024-04-15 PROCEDURE — 99222 1ST HOSP IP/OBS MODERATE 55: CPT

## 2024-04-15 PROCEDURE — 97162 PT EVAL MOD COMPLEX 30 MIN: CPT | Mod: GP

## 2024-04-15 RX ORDER — METOPROLOL TARTRATE 50 MG
25 TABLET ORAL
Refills: 0 | Status: DISCONTINUED | OUTPATIENT
Start: 2024-04-15 | End: 2024-04-17

## 2024-04-15 RX ORDER — LIDOCAINE 4 G/100G
1 CREAM TOPICAL ONCE
Refills: 0 | Status: COMPLETED | OUTPATIENT
Start: 2024-04-15 | End: 2024-04-15

## 2024-04-15 RX ORDER — ACETAMINOPHEN 500 MG
650 TABLET ORAL EVERY 6 HOURS
Refills: 0 | Status: DISCONTINUED | OUTPATIENT
Start: 2024-04-15 | End: 2024-04-17

## 2024-04-15 RX ORDER — ENOXAPARIN SODIUM 100 MG/ML
40 INJECTION SUBCUTANEOUS EVERY 24 HOURS
Refills: 0 | Status: DISCONTINUED | OUTPATIENT
Start: 2024-04-16 | End: 2024-04-17

## 2024-04-15 RX ORDER — INFLUENZA VIRUS VACCINE 15; 15; 15; 15 UG/.5ML; UG/.5ML; UG/.5ML; UG/.5ML
0.7 SUSPENSION INTRAMUSCULAR ONCE
Refills: 0 | Status: DISCONTINUED | OUTPATIENT
Start: 2024-04-15 | End: 2024-04-17

## 2024-04-15 RX ORDER — OXYCODONE HYDROCHLORIDE 5 MG/1
5 TABLET ORAL EVERY 6 HOURS
Refills: 0 | Status: DISCONTINUED | OUTPATIENT
Start: 2024-04-15 | End: 2024-04-17

## 2024-04-15 RX ORDER — CLOPIDOGREL BISULFATE 75 MG/1
75 TABLET, FILM COATED ORAL DAILY
Refills: 0 | Status: DISCONTINUED | OUTPATIENT
Start: 2024-04-15 | End: 2024-04-17

## 2024-04-15 RX ADMIN — LIDOCAINE 1 PATCH: 4 CREAM TOPICAL at 18:12

## 2024-04-15 NOTE — ED PROVIDER NOTE - OBJECTIVE STATEMENT
89-year-old female past medical history of arthritis, Powers's esophagus, CAD, HTN who presents for back pain.  Reports 1 week ago she was moving a chair and threw out her lower back.  Has been having worsening right lower back pain since then.  Denies numbness, weakness, urine incontinence, saddle anesthesia, fevers, nausea, headaches, vision changes, falls.  Has been taking Aleve for pain.

## 2024-04-15 NOTE — H&P ADULT - ASSESSMENT
Vertebral fracture  Vertebral fracture to L1 and L3 - per conversation with ER,  Vertebral compression fractures to L1 and L3, non traumatic - per conversation with ER, neurosurgery does not need patient transferred to [place]. For here will order prn analgesics, rehab and pain management consults. Avoid NSAID for now in view of history of Powers's esophagus     CAD - continue metoprolol and Plavix    History of depression - Paroxetine    Arthritis - noted    HTN - monitor on metoprolol

## 2024-04-15 NOTE — ED PROVIDER NOTE - NS ED MD DISPO SPECIAL CONSIDERATION1
HPI  Deborah Wiseman is a 42 year old female here for comprehensive eye exam.   HPI       Annual Eye Exam    Associated symptoms include Negative for redness, jaw claudication, photophobia and foreign body sensation.  Pain was noted as 0/10.             Comments    Patient present for annual exam. Patient hs no complaints at this time. Patient wearing contact lenses. She likes vision comfort amd fit.  ANA ROSA Schaeffer October 16, 2023 8:26 AM           Last edited by Belinda Patrick on 10/16/2023  8:27 AM.        Denies blurry vision, eye pain, or irritation.  Glasses at night when reading.     PMH: none  POH: Glasses for myopia, daily soft contact lenses, no surgery, no trauma  Oc Meds: none  FH: Denies any glaucoma, age related macular degeneration, or other known eye diseases         Assessment & Plan        (H52.13) Severe myopia of both eyes - Both Eyes  (primary encounter diagnosis)  (Z97.3) Wears contact lenses - Both Eyes  Comment: stable prescription, no signs of contact lens overwear  Plan: Refraction done and prescription for glasses given.  Reviewed symptoms of flashes and new floaters, and to call immediately if they occur.  Recommend computer breaks and artificial tear drops four times a day as needed.     -----------------------------------------------------------------------------------       Patient disposition:   Return in about 1 year (around 10/16/2024) for Comprehensive Exam. Patient to call sooner as needed.    Complete documentation of historical and exam elements from today's encounter can be found in the full encounter summary report (not reduplicated in this progress note). I personally obtained the chief complaint(s) and history of present illness.  I have confirmed and edited as necessary the CC, HPI, PMH/PSH, social history, FMH, ROS, and exam/neuro findings as obtained by the technician or others. I have examined this patient myself and I personally viewed the image(s) and studies listed  above and the documentation reflects my findings and interpretation.     Jessica Gold MD    None

## 2024-04-15 NOTE — ED PROVIDER NOTE - PROGRESS NOTE DETAILS
Authored by Dr. Tijerina: Spoke with Neurosurgery about compression fractures on CT. Recommend placing abdominal binder and following up outpatient because patient has no neurological deficits.

## 2024-04-15 NOTE — ED PROVIDER NOTE - ATTENDING CONTRIBUTION TO CARE
89-year-old female with back pain after moving furniture, no bowel or bladder symptoms, pain is become progressively worse and patient is unable to stand or walk due to pain, on exam vitals appreciated, nontoxic-appearing woman with left paravertebral muscle TTP, is neurologically intact, imaging appreciated, has compression fractures/stenoses, per neurosurgery no intervention, patient however lives alone and is unable to ambulate, will admit for pain control

## 2024-04-15 NOTE — ED ADULT NURSE NOTE - NSFALLHARMRISKINTERV_ED_ALL_ED

## 2024-04-15 NOTE — H&P ADULT - NSHPLABSRESULTS_GEN_ALL_CORE
< from: CT Lumbar Spine No Cont (04.15.24 @ 18:31) >  ACC: [id number] EXAM:  CT LUMBAR SPINE   ORDERED BY: PATTI NEAL   PROCEDURE DATE:  04/15/2024    < from: CT Lumbar Spine No Cont (04.15.24 @ 18:31) >    IMPRESSION:    Moderate compression fracture deformity noted of L3 without associated   bony retropulsion as well as mild compression deformity of the L1   superior endplate.    Multilevel degenerative changes and disc bulging most significant at the   L3-L4 level contributing to moderate spinal stenosis as well as severe   left foraminal narrowing.    ROBERT SAMPSON MD; Attending Radiologist  This document has been electronically signed. Apr 15 2024  6:40PM    < end of copied text >                        13.8   11.56 )-----------( 312      ( 15 Apr 2024 19:55 )             40.5     04-15    140  |  101  |  12  ----------------------------<  84  3.8   |  20  |  0.6<L>    Ca    9.8      15 Apr 2024 19:55    TPro  6.2  /  Alb  4.2  /  TBili  0.6  /  DBili  x   /  AST  20  /  ALT  13  /  AlkPhos  91  04-15          Urinalysis Basic - ( 15 Apr 2024 19:55 )    Color: x / Appearance: x / SG: x / pH: x  Gluc: 84 mg/dL / Ketone: x  / Bili: x / Urobili: x   Blood: x / Protein: x / Nitrite: x   Leuk Esterase: x / RBC: x / WBC x   Sq Epi: x / Non Sq Epi: x / Bacteria: x

## 2024-04-15 NOTE — PATIENT PROFILE ADULT - FALL HARM RISK - RISK INTERVENTIONS

## 2024-04-15 NOTE — H&P ADULT - HISTORY OF PRESENT ILLNESS
89y                 worsening right lower back pain which began about a week ago after she "threw he back out", when moving furniture  89yo female whose PMH includes arthritis, depression, CAD (MI), and HTN presents to the ER due to worsening right lower back pain which began about a week ago after she "threw her back out", when moving a chair. She has been taking Aleve without improvement. ER work up did reveal L1 and L3 compression fractures. Per verbal report, neurosurgery was contacted and they advised no need to transfer patient to [facility] (their location)

## 2024-04-16 PROCEDURE — 99232 SBSQ HOSP IP/OBS MODERATE 35: CPT

## 2024-04-16 RX ORDER — SENNA PLUS 8.6 MG/1
2 TABLET ORAL AT BEDTIME
Refills: 0 | Status: DISCONTINUED | OUTPATIENT
Start: 2024-04-16 | End: 2024-04-16

## 2024-04-16 RX ORDER — PANTOPRAZOLE SODIUM 20 MG/1
40 TABLET, DELAYED RELEASE ORAL
Refills: 0 | Status: DISCONTINUED | OUTPATIENT
Start: 2024-04-16 | End: 2024-04-17

## 2024-04-16 RX ORDER — POLYETHYLENE GLYCOL 3350 17 G/17G
17 POWDER, FOR SOLUTION ORAL DAILY
Refills: 0 | Status: DISCONTINUED | OUTPATIENT
Start: 2024-04-17 | End: 2024-04-17

## 2024-04-16 RX ORDER — SENNA PLUS 8.6 MG/1
2 TABLET ORAL AT BEDTIME
Refills: 0 | Status: DISCONTINUED | OUTPATIENT
Start: 2024-04-17 | End: 2024-04-17

## 2024-04-16 RX ORDER — POLYETHYLENE GLYCOL 3350 17 G/17G
17 POWDER, FOR SOLUTION ORAL
Refills: 0 | Status: DISCONTINUED | OUTPATIENT
Start: 2024-04-16 | End: 2024-04-16

## 2024-04-16 RX ADMIN — OXYCODONE HYDROCHLORIDE 5 MILLIGRAM(S): 5 TABLET ORAL at 08:59

## 2024-04-16 RX ADMIN — OXYCODONE HYDROCHLORIDE 5 MILLIGRAM(S): 5 TABLET ORAL at 10:00

## 2024-04-16 RX ADMIN — ENOXAPARIN SODIUM 40 MILLIGRAM(S): 100 INJECTION SUBCUTANEOUS at 05:46

## 2024-04-16 RX ADMIN — PANTOPRAZOLE SODIUM 40 MILLIGRAM(S): 20 TABLET, DELAYED RELEASE ORAL at 12:34

## 2024-04-16 RX ADMIN — OXYCODONE HYDROCHLORIDE 5 MILLIGRAM(S): 5 TABLET ORAL at 17:43

## 2024-04-16 RX ADMIN — Medication 10 MILLIGRAM(S): at 11:21

## 2024-04-16 RX ADMIN — Medication 25 MILLIGRAM(S): at 05:47

## 2024-04-16 RX ADMIN — Medication 25 MILLIGRAM(S): at 17:42

## 2024-04-16 RX ADMIN — CLOPIDOGREL BISULFATE 75 MILLIGRAM(S): 75 TABLET, FILM COATED ORAL at 11:21

## 2024-04-16 NOTE — PHYSICAL THERAPY INITIAL EVALUATION ADULT - NAME OF DISCHARGE PLANNER
Will inform  when available Xolair Counseling:  Patient informed of potential adverse effects including but not limited to fever, muscle aches, rash and allergic reactions.  The patient verbalized understanding of the proper use and possible adverse effects of Xolair.  All of the patient's questions and concerns were addressed.

## 2024-04-16 NOTE — PHYSICAL THERAPY INITIAL EVALUATION ADULT - PERTINENT HX OF CURRENT PROBLEM, REHAB EVAL
History of Present Illness:   89yo female whose PMH includes arthritis, depression, CAD (MI), and HTN presents to the ER due to worsening right lower back pain which began about a week ago after she "threw her back out", when moving a chair. She has been taking Aleve without improvement. ER work up did reveal L1 and L3 compression fractures. Per verbal report, neurosurgery was contacted and they advised no need to transfer patient to Located within [facility] (their location)

## 2024-04-16 NOTE — PHYSICAL THERAPY INITIAL EVALUATION ADULT - LEVEL OF INDEPENDENCE: STAND/SIT, REHAB EVAL
Patient presents with:  Sore Throat      HPI:     Michael Mix is a 16year old female who presents for sore throat. She was diagnosed with strep throat about 5 days ago and states her symptoms have not improved much since being started on amoxicillin.   Agustin Shirley reviewed and negative except as noted above. Constitutional and Vital Signs Reviewed.     Physical Exam:     Findings:    /81   Pulse 67   Temp 98.1 °F (36.7 °C) (Temporal)   Resp 18   Wt 64.9 kg   LMP 01/08/2022 (Exact Date)   SpO2 100%   BMI 26.16 condition today. Follow Up with:  Jessica Grullon, DO  [address]  [facility] [address] [phone]    Schedule an appointment as soon as possible for a visit in 3 days contact guard

## 2024-04-16 NOTE — CONSULT NOTE ADULT - ASSESSMENT
IMPRESSION: Rehab of 89 y.o  f  rehab  for  lbp  comp  fx  L1,L3     PRECAUTIONS: [  ] Cardiac  [  ] Respiratory  [  ] Seizures [  ] Contact Isolation  [  ] Droplet Isolation  [ FALL ] Other    Weight Bearing Status:     RECOMMENDATION:    Out of Bed to Chair     DVT/Decubiti Prophylaxis    REHAB PLAN:     [  x ] Bedside P/T 3-5 times a week   [x   ]   Bedside O/T  2-3 times a week             [   ] No Rehab Therapy Indicated                   [   ]  Speech Therapy   Conditioning/ROM                                    ADL  Bed Mobility                                               Conditioning/ROM  Transfers                                                     Bed Mobility  Sitting /Standing Balance                         Transfers                                        Gait Training                                               Sitting/Standing Balance  Stair Training [   ]Applicable                    Home equipment Eval                                                                        Splinting  [   ] Only      GOALS:   ADL   [ x  ]   Independent                    Transfers  [  x ] Independent                          Ambulation  [ x  ] Independent     [x    ] With device                            [x   ]  CG                                                         [ x  ]  CG                                                                  [  x ] CG                            [    ] Min A                                                   [   ] Min A                                                              [   ] Min  A          DISCHARGE PLAN:   [   ]  Good candidate for Intensive Rehabilitation/Hospital based-[facility]A [facility]                                             Will tolerate 3hrs Intensive Rehab Daily                                       [  xx  ]  Short Term Rehab in Skilled Nursing Facility  pain med  d/w  ptn rehab  plan                                        [    ]  Home with Outpatient or VN services                                         [    ]  Possible Candidate for Intensive Hospital based Rehab

## 2024-04-16 NOTE — PHYSICAL THERAPY INITIAL EVALUATION ADULT - GENERAL OBSERVATIONS, REHAB EVAL
8:40-9:05 Chart reviewed. Patient available to be seen for physical therapy, reports pain, confirmed with RN. Pt rec'd in bed +Primafit and abdominal binder in NAD.

## 2024-04-16 NOTE — PHYSICAL THERAPY INITIAL EVALUATION ADULT - PRECAUTIONS/LIMITATIONS, REHAB EVAL
contact the dictating provider for clarification.     Dallin Mathew MD   [facility]        Dallin Mathew MD  03/09/24 2038     fall precautions

## 2024-04-16 NOTE — PROGRESS NOTE ADULT - SUBJECTIVE AND OBJECTIVE BOX
MYRIAM ALMA  89y  [facility]-S 4I 019 B      Patient is a 89y old  Female who presents with a chief complaint of     INTERVAL HPI/OVERNIGHT EVENTS:        REVIEW OF SYSTEMS:        FAMILY HISTORY:    T(C): 36.2 (04-16-24 @ 06:02), Max: 36.7 (04-15-24 @ 17:04)  HR: 71 (04-16-24 @ 06:02) (71 - 88)  BP: 131/61 (04-16-24 @ 06:02) (131/61 - 155/69)  RR: 18 (04-16-24 @ 06:02) (18 - 20)  SpO2: 95% (04-16-24 @ 06:02) (95% - 99%)  Wt(kg): --Vital Signs Last 24 Hrs  T(C): 36.2 (16 Apr 2024 06:02), Max: 36.7 (15 Apr 2024 17:04)  T(F): 97.1 (16 Apr 2024 06:02), Max: 98 (15 Apr 2024 17:04)  HR: 71 (16 Apr 2024 06:02) (71 - 88)  BP: 131/61 (16 Apr 2024 06:02) (131/61 - 155/69)  BP(mean): --  RR: 18 (16 Apr 2024 06:02) (18 - 20)  SpO2: 95% (16 Apr 2024 06:02) (95% - 99%)    Parameters below as of 16 Apr 2024 06:02  Patient On (Oxygen Delivery Method): room air        PHYSICAL EXAM:  GENERAL: NAD, well-groomed, well-developed  HEAD:  Atraumatic, Normocephalic  EYES: EOMI, PERRLA, conjunctiva and sclera clear  ENMT: No tonsillar erythema, exudates, or enlargement; Moist mucous membranes, Good dentition, No lesions  NECK: Supple, No JVD, Normal thyroid  NERVOUS SYSTEM:  Alert & Oriented X3, Good concentration; Motor Strength 5/5 B/L upper and lower extremities; DTRs 2+ intact and symmetric  PULM: Clear to auscultation bilaterally  CARDIAC: Regular rate and rhythm; No murmurs, rubs, or gallops  GI: Soft, Nontender, Nondistended; Bowel sounds present  EXTREMITIES:  2+ Peripheral Pulses, No clubbing, cyanosis, or edema  LYMPH: No lymphadenopathy noted  SKIN: No rashes or lesions    Consultant(s) Notes Reviewed:  [x ] YES  [ ] NO  Care Discussed with Consultants/Other Providers [ x] YES  [ ] NO    LABS:                            13.8   11.56 )-----------( 312      ( 15 Apr 2024 19:55 )             40.5   04-15    140  |  101  |  12  ----------------------------<  84  3.8   |  20  |  0.6<L>    Ca    9.8      15 Apr 2024 19:55    TPro  6.2  /  Alb  4.2  /  TBili  0.6  /  DBili  x   /  AST  20  /  ALT  13  /  AlkPhos  91  04-15            acetaminophen     Tablet .. 650 milliGRAM(s) Oral every 6 hours PRN  clopidogrel Tablet 75 milliGRAM(s) Oral daily  enoxaparin Injectable 40 milliGRAM(s) SubCutaneous every 24 hours  influenza  Vaccine (HIGH DOSE) 0.7 milliLiter(s) IntraMuscular once  metoprolol tartrate 25 milliGRAM(s) Oral two times a day  oxyCODONE    IR 5 milliGRAM(s) Oral every 6 hours PRN  PARoxetine 10 milliGRAM(s) Oral daily      89yo female whose PMH includes arthritis, depression, CAD (MI), and HTN presents to the ER due to worsening right lower back pain which began about a week ago after she "threw her back out",    1. Acute on top of chronic back pain due to vertebral compression fracture L1- L3   - Admit to medicine   - Pain meds prn              - laxative   *  per conversation with ER, neurosurgery does not need patient transferred to [place].  - CT spine:  a) Moderate compression fracture deformity noted of L3 without associated bony retropulsion as well as mild compression deformity of the L1 superior endplate.  b) Multilevel degenerative changes and disc bulging most significant at the L3-L4 level contributing to moderate spinal stenosis as well as severe left foraminal narrowing.  - PT eval:pending       2. hx of caldwell esophagus  - Cont home meds     3. CAD - continue metoprolol and Plavix    4.History of depression - Paroxetine    5. Arthritis - noted    6. HTN - monitor on metoprolol       ALMA MIGUEL  89y  [facility]-S 4I 019 B      Patient is a 89y old  Female who presents with a chief complaint of     INTERVAL HPI/OVERNIGHT EVENTS:    Patient feels well  She has multipe episode of diarrhea overnight (she was constipated before presentation). no other events noted         FAMILY HISTORY:    T(C): 36.2 (04-16-24 @ 06:02), Max: 36.7 (04-15-24 @ 17:04)  HR: 71 (04-16-24 @ 06:02) (71 - 88)  BP: 131/61 (04-16-24 @ 06:02) (131/61 - 155/69)  RR: 18 (04-16-24 @ 06:02) (18 - 20)  SpO2: 95% (04-16-24 @ 06:02) (95% - 99%)  Wt(kg): --Vital Signs Last 24 Hrs  T(C): 36.2 (16 Apr 2024 06:02), Max: 36.7 (15 Apr 2024 17:04)  T(F): 97.1 (16 Apr 2024 06:02), Max: 98 (15 Apr 2024 17:04)  HR: 71 (16 Apr 2024 06:02) (71 - 88)  BP: 131/61 (16 Apr 2024 06:02) (131/61 - 155/69)  BP(mean): --  RR: 18 (16 Apr 2024 06:02) (18 - 20)  SpO2: 95% (16 Apr 2024 06:02) (95% - 99%)    Parameters below as of 16 Apr 2024 06:02  Patient On (Oxygen Delivery Method): room air        PHYSICAL EXAM:  GENERAL: NAD, well-groomed, well-developed  NERVOUS SYSTEM:  Alert & Oriented X3  PULM: Clear to auscultation bilaterally  CARDIAC: Regular rate and rhythm;  GI: Soft, Nontender, Nondistended; Bowel sounds present  EXTREMITIES:  2+ Peripheral Pulses,  No pain except upon ambulation or moving      Consultant(s) Notes Reviewed:  [x ] YES  [ ] NO  Care Discussed with Consultants/Other Providers [ x] YES  [ ] NO    LABS:                            13.8   11.56 )-----------( 312      ( 15 Apr 2024 19:55 )             40.5   04-15    140  |  101  |  12  ----------------------------<  84  3.8   |  20  |  0.6<L>    Ca    9.8      15 Apr 2024 19:55    TPro  6.2  /  Alb  4.2  /  TBili  0.6  /  DBili  x   /  AST  20  /  ALT  13  /  AlkPhos  91  04-15            acetaminophen     Tablet .. 650 milliGRAM(s) Oral every 6 hours PRN  clopidogrel Tablet 75 milliGRAM(s) Oral daily  enoxaparin Injectable 40 milliGRAM(s) SubCutaneous every 24 hours  influenza  Vaccine (HIGH DOSE) 0.7 milliLiter(s) IntraMuscular once  metoprolol tartrate 25 milliGRAM(s) Oral two times a day  oxyCODONE    IR 5 milliGRAM(s) Oral every 6 hours PRN  PARoxetine 10 milliGRAM(s) Oral daily      89yo female whose PMH includes arthritis, depression, CAD (MI), and HTN presents to the ER due to worsening right lower back pain which began about a week ago after she "threw her back out",    1. Acute on top of chronic back pain due to vertebral compression fracture L1- L3   * pt reported that she lives by herself   - Admit to medicine   - Pain meds prn              - laxative   *  per conversation with ER, neurosurgery does not need patient transferred to [place].  - CT spine:  a) Moderate compression fracture deformity noted of L3 without associated bony retropulsion as well as mild compression deformity of the L1 superior endplate.  b) Multilevel degenerative changes and disc bulging most significant at the L3-L4 level contributing to moderate spinal stenosis as well as severe left foraminal narrowing.  - PT eval:  a) Need rehab       2. hx of caldwell esophagus  - Cont home meds     3. CAD - continue metoprolol and Plavix    4.History of depression - Paroxetine    5. Arthritis - noted    6. HTN - monitor on metoprolol      7. Constipation resolved  - Cont laxative    8. DVT/GI px

## 2024-04-16 NOTE — CONSULT NOTE ADULT - SUBJECTIVE AND OBJECTIVE BOX
HPI: 89 yo female whose PMH includes arthritis, depression, CAD (MI), and HTN presents to the ER due to worsening right lower back pain which began about a week ago after she "threw her back out", when moving a chair. She has been taking Aleve without improvement. ER work up did reveal L1 and L3 compression fractures. Per verbal report, neurosurgery was contacted and they advised no need to transfer patient to [facility] ,  ptn seen and exam at  bed  side  nad  c/o lpb  7/10  pain scale  ptn  labs  imaging  and  medical  notes are  appreciated      PTN  REFERRED TO ACUTE  REHAB  FOR  EVAL AND  TX   PAST MEDICAL & SURGICAL HISTORY:  CAD (coronary artery disease)      HTN (hypertension)    lt thr  Arthritis      Barretts esophagus      No significant past surgical history          Hospital Course:    TODAY'S SUBJECTIVE & REVIEW OF SYMPTOMS:     Constitutional WNL   Cardio WNL   Resp WNL   GI WNL  Heme WNL  Endo WNL  Skin WNL  MSK WNL  Neuro WNL  Cognitive WNL  Psych WNL      MEDICATIONS  (STANDING):  clopidogrel Tablet 75 milliGRAM(s) Oral daily  enoxaparin Injectable 40 milliGRAM(s) SubCutaneous every 24 hours  influenza  Vaccine (HIGH DOSE) 0.7 milliLiter(s) IntraMuscular once  metoprolol tartrate 25 milliGRAM(s) Oral two times a day  PARoxetine 10 milliGRAM(s) Oral daily    MEDICATIONS  (PRN):  acetaminophen     Tablet .. 650 milliGRAM(s) Oral every 6 hours PRN Mild Pain (1 - 3)  oxyCODONE    IR 5 milliGRAM(s) Oral every 6 hours PRN Moderate Pain (4 - 6)      FAMILY HISTORY:      Allergies    penicillin (Hives)    Intolerances        SOCIAL HISTORY:    [  ] Etoh  [  ] Smoking  [  ] Substance abuse     Home Environment:  [ x] Home Alone  [  ] Lives with Family  [  ] Home Health Aid    Dwelling:  [ x ] Apartment  [  ] Private House  [  ] Adult Home  [  ] Skilled Nursing Facility      [  ] Short Term  [  ] Long Term  [-  ] Stairs       Elevator [  ]    FUNCTIONAL STATUS PTA: (Check all that apply)  Ambulation: [  x ]Independent    [  ] Dependent     [  ] Non-Ambulatory  Assistive Device: [  ] SA Cane  [  ]  Q Cane  [  x] Walker  [  ]  Wheelchair  ADL : [ x ] Independent  [  ]  Dependent       Vital Signs Last 24 Hrs  T(C): 36.2 (16 Apr 2024 06:02), Max: 36.7 (15 Apr 2024 17:04)  T(F): 97.1 (16 Apr 2024 06:02), Max: 98 (15 Apr 2024 17:04)  HR: 71 (16 Apr 2024 06:02) (71 - 88)  BP: 131/61 (16 Apr 2024 06:02) (131/61 - 155/69)  BP(mean): --  RR: 18 (16 Apr 2024 06:02) (18 - 20)  SpO2: 95% (16 Apr 2024 06:02) (95% - 99%)    Parameters below as of 16 Apr 2024 06:02  Patient On (Oxygen Delivery Method): room air          PHYSICAL EXAM: Alert & Oriented X3  GENERAL: NAD, well-groomed, well-developed  HEAD:  Atraumatic, Normocephalic  EYES: EOMI, PERRLA, conjunctiva and sclera clear  NECK: Supple, No JVD, Normal thyroid  CHEST/LUNG: Clear to percussion bilaterally; No rales, rhonchi, wheezing, or rubs  HEART: Regular rate and rhythm; No murmurs, rubs, or gallops  ABDOMEN: Soft, Nontender, Nondistended; Bowel sounds present  EXTREMITIES:  2+ Peripheral Pulses, No clubbing, cyanosis, or edema    NERVOUS SYSTEM:  Cranial Nerves 2-12 intact [ x ] Abnormal  [  ]  ROM: WFL all extremities [  ]  Abnormal [ limited  varun  le ]  Motor Strength: WFL all extremities  [  ]  Abnormal [  ]  Sensation: intact to light touch [x  ] Abnormal [  ]  Reflexes: Symmetric [ x ]  Abnormal [  ]    FUNCTIONAL STATUS:  Bed Mobility: Independent [  ]  Supervision [  ]  Needs Assistance [ x ]  N/A [  ]  Transfers: Independent [  ]  Supervision [  ]  Needs Assistance [x  ]  N/A [  ]   Ambulation: Independent [  ]  Supervision [  ]  Needs Assistance [x  ]  N/A [  ]  ADL: Independent [  ] Requires Assistance [  ] N/A [x  ]  SEE PT/OT IE NOTES    LABS:                        13.8   11.56 )-----------( 312      ( 15 Apr 2024 19:55 )             40.5     04-15    140  |  101  |  12  ----------------------------<  84  3.8   |  20  |  0.6<L>    Ca    9.8      15 Apr 2024 19:55    TPro  6.2  /  Alb  4.2  /  TBili  0.6  /  DBili  x   /  AST  20  /  ALT  13  /  AlkPhos  91  04-15      Urinalysis Basic - ( 15 Apr 2024 19:55 )    Color: x / Appearance: x / SG: x / pH: x  Gluc: 84 mg/dL / Ketone: x  / Bili: x / Urobili: x   Blood: x / Protein: x / Nitrite: x   Leuk Esterase: x / RBC: x / WBC x   Sq Epi: x / Non Sq Epi: x / Bacteria: x        RADIOLOGY & ADDITIONAL STUDIES:< from: CT Lumbar Spine No Cont (04.15.24 @ 18:31) >    Moderate compression fracture deformity noted of L3 without associated   bony retropulsion as well as mild compression deformity of the L1   superior endplate.    Multilevel degenerative changes and disc bulging most significant at the   L3-L4 level contributing to moderate spinal stenosis as well as severe   left foraminal narrowing.    < end of copied text >      Assesment:

## 2024-04-17 ENCOUNTER — TRANSCRIPTION ENCOUNTER (OUTPATIENT)
Age: 89
End: 2024-04-17

## 2024-04-17 VITALS
OXYGEN SATURATION: 95 % | HEART RATE: 83 BPM | RESPIRATION RATE: 18 BRPM | SYSTOLIC BLOOD PRESSURE: 112 MMHG | TEMPERATURE: 98 F | DIASTOLIC BLOOD PRESSURE: 65 MMHG

## 2024-04-17 LAB — GLUCOSE BLDC GLUCOMTR-MCNC: 88 MG/DL — SIGNIFICANT CHANGE UP (ref 70–99)

## 2024-04-17 PROCEDURE — 99239 HOSP IP/OBS DSCHRG MGMT >30: CPT

## 2024-04-17 RX ORDER — OXYCODONE HYDROCHLORIDE 5 MG/1
1 TABLET ORAL
Qty: 0 | Refills: 0 | DISCHARGE
Start: 2024-04-17

## 2024-04-17 RX ORDER — LACTULOSE 10 G/15ML
20 SOLUTION ORAL ONCE
Refills: 0 | Status: COMPLETED | OUTPATIENT
Start: 2024-04-17 | End: 2024-04-17

## 2024-04-17 RX ORDER — SENNA PLUS 8.6 MG/1
2 TABLET ORAL
Qty: 0 | Refills: 0 | DISCHARGE
Start: 2024-04-17

## 2024-04-17 RX ORDER — POLYETHYLENE GLYCOL 3350 17 G/17G
17 POWDER, FOR SOLUTION ORAL
Qty: 0 | Refills: 0 | DISCHARGE
Start: 2024-04-17

## 2024-04-17 RX ORDER — ACETAMINOPHEN 500 MG
2 TABLET ORAL
Qty: 0 | Refills: 0 | DISCHARGE
Start: 2024-04-17

## 2024-04-17 RX ADMIN — Medication 10 MILLIGRAM(S): at 11:38

## 2024-04-17 RX ADMIN — Medication 25 MILLIGRAM(S): at 16:42

## 2024-04-17 RX ADMIN — LACTULOSE 20 GRAM(S): 10 SOLUTION ORAL at 14:12

## 2024-04-17 RX ADMIN — Medication 25 MILLIGRAM(S): at 06:06

## 2024-04-17 RX ADMIN — ENOXAPARIN SODIUM 40 MILLIGRAM(S): 100 INJECTION SUBCUTANEOUS at 06:03

## 2024-04-17 RX ADMIN — PANTOPRAZOLE SODIUM 40 MILLIGRAM(S): 20 TABLET, DELAYED RELEASE ORAL at 06:06

## 2024-04-17 RX ADMIN — OXYCODONE HYDROCHLORIDE 5 MILLIGRAM(S): 5 TABLET ORAL at 09:17

## 2024-04-17 RX ADMIN — CLOPIDOGREL BISULFATE 75 MILLIGRAM(S): 75 TABLET, FILM COATED ORAL at 11:38

## 2024-04-17 RX ADMIN — POLYETHYLENE GLYCOL 3350 17 GRAM(S): 17 POWDER, FOR SOLUTION ORAL at 11:39

## 2024-04-17 NOTE — DISCHARGE NOTE PROVIDER - NSDCCPCAREPLAN_GEN_ALL_CORE_FT
Unna Boot Applied: No Primary Defect Length In Cm (Final Defect Size - Required For Flaps/Grafts): 1.9 Stage 1: Number Of Blocks?: 2 Display The Individual Mohs Indications As Separate Paragraphs: Yes H Plasty Text: Given the location of the defect, shape of the defect and the proximity to free margins a H-plasty was deemed most appropriate for repair. Using a sterile surgical marker, the appropriate advancement arms of the H-plasty were drawn incorporating the defect and placing the expected incisions within the relaxed skin tension lines where possible. The area thus outlined was incised deep to adipose tissue with a #15 scalpel blade. The skin margins were undermined to an appropriate distance in all directions utilizing iris scissors. The opposing advancement arms were then advanced into place in opposite direction and anchored with interrupted buried subcutaneous sutures. Repair Type: Referred to plastics for closure Area M Indication Text: Tumors in this location are included in Area M (cheek, forehead, scalp, neck, jawline and pretibial skin). Mohs surgery is indicated for tumors in these anatomic locations. Stage 2: Number Of Blocks?: 3 Island Pedicle Flap With Canthal Suspension Text: The defect edges were debeveled with a #15 scalpel blade. Given the location of the defect, shape of the defect and the proximity to free margins an island pedicle advancement flap was deemed most appropriate. Using a sterile surgical marker, an appropriate advancement flap was drawn incorporating the defect, outlining the appropriate donor tissue and placing the expected incisions within the relaxed skin tension lines where possible. The area thus outlined was incised deep to adipose tissue with a #15 scalpel blade. The skin margins were undermined to an appropriate distance in all directions around the primary defect and laterally outward around the island pedicle utilizing iris scissors. There was minimal undermining beneath the pedicle flap. A suspension suture was placed in the canthal tendon to prevent tension and prevent ectropion. Alar Island Pedicle Flap Text: The defect edges were debeveled with a #15 scalpel blade. Given the location of the defect, shape of the defect and the proximity to the alar rim an island pedicle advancement flap was deemed most appropriate. Using a sterile surgical marker, an appropriate advancement flap was drawn incorporating the defect, outlining the appropriate donor tissue and placing the expected incisions within the nasal ala running parallel to the alar rim. The area thus outlined was incised with a #15 scalpel blade. The skin margins were undermined minimally to an appropriate distance in all directions around the primary defect and laterally outward around the island pedicle utilizing iris scissors. There was minimal undermining beneath the pedicle flap. Split-Thickness Skin Graft Text: The defect edges were debeveled with a #15 scalpel blade. Given the location of the defect, shape of the defect and the proximity to free margins a split thickness skin graft was deemed most appropriate. Using a sterile surgical marker, the primary defect shape was transferred to the donor site. The split thickness graft was then harvested. The skin graft was then placed in the primary defect and oriented appropriately. Closure 3 Information: This tab is for additional flaps and grafts above and beyond our usual structured repairs. Please note if you enter information here it will not currently bill and you will need to add the billing information manually. Epidermal Autograft Text: The defect edges were debeveled with a #15 scalpel blade. Given the location of the defect, shape of the defect and the proximity to free margins an epidermal autograft was deemed most appropriate. Using a sterile surgical marker, the primary defect shape was transferred to the donor site. The epidermal graft was then harvested. The skin graft was then placed in the primary defect and oriented appropriately. Consent (Scalp)/Introductory Paragraph: The rationale for Mohs was explained to the patient and consent was obtained. The risks, benefits and alternatives to therapy were discussed in detail. Specifically, the risks of changes in hair growth pattern secondary to repair, infection, scarring, bleeding, prolonged wound healing, incomplete removal, allergy to anesthesia, nerve injury and recurrence were addressed. Prior to the procedure, the treatment site was clearly identified and confirmed by the patient. All components of Universal Protocol/PAUSE Rule completed. O-T Advancement Flap Text: The defect edges were debeveled with a #15 scalpel blade. Given the location of the defect, shape of the defect and the proximity to free margins an O-T advancement flap was deemed most appropriate. Using a sterile surgical marker, an appropriate advancement flap was drawn incorporating the defect and placing the expected incisions within the relaxed skin tension lines where possible. The area thus outlined was incised deep to adipose tissue with a #15 scalpel blade. The skin margins were undermined to an appropriate distance in all directions utilizing iris scissors. Stage 14: Additional Anesthesia Type: 1% lidocaine with epinephrine Localized Dermabrasion Text: The patient was draped in routine manner. Localized dermabrasion using 3 x 17 mm wire brush was performed in routine manner to papillary dermis. This spot dermabrasion is being performed to complete skin cancer reconstruction. It also will eliminate the other sun damaged precancerous cells that are known to be part of the regional effect of a lifetime's worth of sun exposure. This localized dermabrasion is therapeutic and should not be considered cosmetic in any regard. Epidermal Closure Graft Donor Site (Optional): simple interrupted Stage 3: Additional Anesthesia Type: 1% lidocaine with 1:100,000 epinephrine and a 1:10 solution of 8.4% sodium bicarbonate Consent 1/Introductory Paragraph: The rationale for Mohs was explained to the patient and consent was obtained. The risks, benefits and alternatives to therapy were discussed in detail. Specifically, the risks of infection, scarring, bleeding, prolonged wound healing, incomplete removal, allergy to anesthesia, nerve injury and recurrence were addressed. Prior to the procedure, the treatment site was clearly identified and confirmed by the patient. No guarantees were expressed or implied to the patient. The importance of close clinical follow-up for routine full body skin examinations, as recommended by the 2018 NCCN Clinical Practice Guidelines in Oncology for any patient with a history of non-melanoma skin cancer, was reviewed with the patient. All components of Universal Protocol/PAUSE Rule completed. Referred To Oculoplastics For Closure Text (Leave Blank If You Do Not Want): After obtaining clear surgical margins the patient was sent to oculoplastics for surgical repair. The patient understands they will receive post-surgical care and follow-up from the referring physician's office. Wound Care (No Sutures): Petrolatum Spiral Flap Text: The defect edges were debeveled with a #15 scalpel blade. Given the location of the defect, shape of the defect and the proximity to free margins a spiral flap was deemed most appropriate. Using a sterile surgical marker, an appropriate rotation flap was drawn incorporating the defect and placing the expected incisions within the relaxed skin tension lines where possible. The area thus outlined was incised deep to adipose tissue with a #15 scalpel blade. The skin margins were undermined to an appropriate distance in all directions utilizing iris scissors. Crescentic Advancement Flap Text: The defect edges were debeveled with a #15 scalpel blade. Given the location of the defect and the proximity to free margins a crescentic advancement flap was deemed most appropriate. Using a sterile surgical marker, the appropriate advancement flap was drawn incorporating the defect and placing the expected incisions within the relaxed skin tension lines where possible. The area thus outlined was incised deep to adipose tissue with a #15 scalpel blade. The skin margins were undermined to an appropriate distance in all directions utilizing iris scissors. PRINCIPAL DISCHARGE DIAGNOSIS  Diagnosis: Back pain  Assessment and Plan of Treatment: due to compression fracture      SECONDARY DISCHARGE DIAGNOSES  Diagnosis: Lumbar compression fracture  Assessment and Plan of Treatment: Take pain meds as needed it with laxative. The pain will persist for at least 40 days     Quadrants Reporting?: 0 Cheiloplasty (Less Than 50%) Text: A decision was made to reconstruct the defect with a  cheiloplasty. The defect was undermined extensively. Additional obicularis oris muscle was excised with a 15 blade scalpel. The defect was converted into a full thickness wedge, of less than 50% of the vertical height of the lip, to facilite a better cosmetic result. Small vessels were then tied off with 5-0 monocyrl. The obicularis oris, superficial fascia, adipose and dermis were then reapproximated. After the deeper layers were approximated the epidermis was reapproximated with particular care given to realign the vermilion border. Intermediate Repair Preamble Text (Leave Blank If You Do Not Want): Undermining was performed with blunt dissection. Posterior Auricular Interpolation Flap Text: A decision was made to reconstruct the defect utilizing an interpolation axial flap and a staged reconstruction. A telfa template was made of the defect. This telfa template was then used to outline the posterior auricular interpolation flap. The donor area for the pedicle flap was then injected with anesthesia. The flap was excised through the skin and subcutaneous tissue down to the layer of the underlying musculature. The pedicle flap was carefully excised within this deep plane to maintain its blood supply. The edges of the donor site were undermined. The donor site was closed in a primary fashion. The pedicle was then rotated into position and sutured. Once the tube was sutured into place, adequate blood supply was confirmed with blanching and refill. The pedicle was then wrapped with xeroform gauze and dressed appropriately with a telfa and gauze bandage to ensure continued blood supply and protect the attached pedicle. Advancement Flap (Double) Text: The defect edges were debeveled with a #15 scalpel blade. Given the location of the defect and the proximity to free margins a double advancement flap was deemed most appropriate. Using a sterile surgical marker, the appropriate advancement flaps were drawn incorporating the defect and placing the expected incisions within the relaxed skin tension lines where possible. The area thus outlined was incised deep to adipose tissue with a #15 scalpel blade. The skin margins were undermined to an appropriate distance in all directions utilizing iris scissors. Depth Of Tumor Invasion (For Histology): dermis Bcc Micronodular Histology Text: There are nests of atypical basophilic cells present within the fibrotic dermis displaying a micronodular pattern of invasion. Stage 2: Additional Anesthesia Type: 1% lidocaine with 1:100,000 epinephrine Keystone Flap Text: The defect edges were debeveled with a #15 scalpel blade. Given the location of the defect, shape of the defect a keystone flap was deemed most appropriate. Using a sterile surgical marker, an appropriate keystone flap was drawn incorporating the defect, outlining the appropriate donor tissue and placing the expected incisions within the relaxed skin tension lines where possible. The area thus outlined was incised deep to adipose tissue with a #15 scalpel blade. The skin margins were undermined to an appropriate distance in all directions around the primary defect and laterally outward around the flap utilizing iris scissors. Z Plasty Text: The lesion was extirpated to the level of the fat with a #15 scalpel blade. Given the location of the defect, shape of the defect and the proximity to free margins a Z-plasty was deemed most appropriate for repair. Using a sterile surgical marker, the appropriate transposition arms of the Z-plasty were drawn incorporating the defect and placing the expected incisions within the relaxed skin tension lines where possible. The area thus outlined was incised deep to adipose tissue with a #15 scalpel blade. The skin margins were undermined to an appropriate distance in all directions utilizing iris scissors. The opposing transposition arms were then transposed into place in opposite direction and anchored with interrupted buried subcutaneous sutures. Bilobed Flap Text: The defect edges were debeveled with a #15 scalpel blade. Given the location of the defect and the proximity to free margins a bilobe flap was deemed most appropriate. Using a sterile surgical marker, an appropriate bilobe flap drawn around the defect. The area thus outlined was incised deep to adipose tissue with a #15 scalpel blade. The skin margins were undermined to an appropriate distance in all directions utilizing iris scissors. Stage 2: Additional Anesthesia Volume In Cc: 1.5 Skin Substitute Text: The defect edges were debeveled with a #15 scalpel blade. Given the location of the defect, shape of the defect and the proximity to free margins a skin substitute graft was deemed most appropriate. The graft material was trimmed to fit the size of the defect. The graft was then placed in the primary defect and oriented appropriately. Island Pedicle Flap-Requiring Vessel Identification Text: The defect edges were debeveled with a #15 scalpel blade. Given the location of the defect, shape of the defect and the proximity to free margins an island pedicle advancement flap was deemed most appropriate. Using a sterile surgical marker, an appropriate advancement flap was drawn, based on the axial vessel mentioned above, incorporating the defect, outlining the appropriate donor tissue and placing the expected incisions within the relaxed skin tension lines where possible. The area thus outlined was incised deep to adipose tissue with a #15 scalpel blade. The skin margins were undermined to an appropriate distance in all directions around the primary defect and laterally outward around the island pedicle utilizing iris scissors. There was minimal undermining beneath the pedicle flap. Bcc  Nodular Histology Text: There are nests of basophilic cells present as nodules within the dermis. Stromal changes and retraction artifacts are noted. These findings are consistent with a nodular basal cell carcinoma. Consent (Nose)/Introductory Paragraph: The rationale for Mohs was explained to the patient and consent was obtained. The risks, benefits and alternatives to therapy were discussed in detail. Specifically, the risks of nasal deformity, changes in the flow of air through the nose, infection, scarring, bleeding, prolonged wound healing, incomplete removal, allergy to anesthesia, nerve injury and recurrence were addressed. Prior to the procedure, the treatment site was clearly identified and confirmed by the patient. All components of Universal Protocol/PAUSE Rule completed. Lazy S Complex Repair Preamble Text (Leave Blank If You Do Not Want): Extensive wide undermining was performed. Initial Size Of Lesion: 0.5 Closure 2 Information: This tab is for additional flaps and grafts, including complex repair and grafts and complex repair and flaps. You can also specify a different location for the additional defect, if the location is the same you do not need to select a new one. We will insert the automated text for the repair you select below just as we do for solitary flaps and grafts. Please note that at this time if you select a location with a different insurance zone you will need to override the ICD10 and CPT if appropriate. Advancement-Rotation Flap Text: The defect edges were debeveled with a #15 scalpel blade. Given the location of the defect, shape of the defect and the proximity to free margins an advancement-rotation flap was deemed most appropriate. Using a sterile surgical marker, an appropriate flap was drawn incorporating the defect and placing the expected incisions within the relaxed skin tension lines where possible. The area thus outlined was incised deep to adipose tissue with a #15 scalpel blade. The skin margins were undermined to an appropriate distance in all directions utilizing iris scissors. Referred To Plastics For Closure Text (Leave Blank If You Do Not Want): After obtaining clear surgical margins the patient was sent to plastics for surgical repair. The patient understands they will receive post-surgical care and follow-up from the referring physician's office. Consent (Marginal Mandibular)/Introductory Paragraph: The rationale for Mohs was explained to the patient and consent was obtained. The risks, benefits and alternatives to therapy were discussed in detail. Specifically, the risks of damage to the marginal mandibular branch of the facial nerve, infection, scarring, bleeding, prolonged wound healing, incomplete removal, allergy to anesthesia, and recurrence were addressed. Prior to the procedure, the treatment site was clearly identified and confirmed by the patient. All components of Universal Protocol/PAUSE Rule completed. Composite Graft Text: The defect edges were debeveled with a #15 scalpel blade. Given the location of the defect, shape of the defect, the proximity to free margins and the fact the defect was full thickness a composite graft was deemed most appropriate. The defect was outline and then transferred to the donor site. A full thickness graft was then excised from the donor site. The graft was then placed in the primary defect, oriented appropriately and then sutured into place. The secondary defect was then repaired using a primary closure. Deep Sutures: 4-0 Monocryl Same Histology In Subsequent Stages Text: The pattern and morphology of the tumor is as described in the first stage. Star Wedge Flap Text: The defect edges were debeveled with a #15 scalpel blade. Given the location of the defect, shape of the defect and the proximity to free margins a star wedge flap was deemed most appropriate. Using a sterile surgical marker, an appropriate rotation flap was drawn incorporating the defect and placing the expected incisions within the relaxed skin tension lines where possible. The area thus outlined was incised deep to adipose tissue with a #15 scalpel blade. The skin margins were undermined to an appropriate distance in all directions utilizing iris scissors. Home Suture Removal Text: Patient was provided instructions on removing sutures and will remove their sutures at home. If they have any questions or difficulties they will call the office. Melanoma In Situ Histology Text: On a sun-damaged skin, there is a broad and asymmetrical proliferation of enlarged and atypical melanocytes present continuously as single cells and in small irregular coalescing nests along the dermoepidermal junction. The melanocytes extend into the superficial portions of epithelial structures of adnexa. Pagetoid spread of melanocytes is appreciated. Occasional mitotic figures and individually necrotic melanocytes are also noted. In the underlying papillary dermis, there is mild lymphocytic inflammatory infiltrate with prominent dermal fibroplasia and melanophages. Rhombic Flap Text: The defect edges were debeveled with a #15 scalpel blade. Given the location of the defect and the proximity to free margins a rhombic flap was deemed most appropriate. Using a sterile surgical marker, an appropriate rhombic flap was drawn incorporating the defect. The area thus outlined was incised deep to adipose tissue with a #15 scalpel blade. The skin margins were undermined to an appropriate distance in all directions utilizing iris scissors. Bilobed Transposition Flap Text: The defect edges were debeveled with a #15 scalpel blade. Given the location of the defect and the proximity to free margins a bilobed transposition flap was deemed most appropriate. Using a sterile surgical marker, an appropriate bilobe flap drawn around the defect. The area thus outlined was incised deep to adipose tissue with a #15 scalpel blade. The skin margins were undermined to an appropriate distance in all directions utilizing iris scissors. Graft Donor Site Bandage (Optional-Leave Blank If You Don't Want In Note): Steri-strips and a pressure bandage were applied to the donor site. Modified Advancement Flap Text: The defect edges were debeveled with a #15 scalpel blade. Given the location of the defect, shape of the defect and the proximity to free margins a modified advancement flap was deemed most appropriate. Using a sterile surgical marker, an appropriate advancement flap was drawn incorporating the defect and placing the expected incisions within the relaxed skin tension lines where possible. The area thus outlined was incised deep to adipose tissue with a #15 scalpel blade. The skin margins were undermined to an appropriate distance in all directions utilizing iris scissors. Presence Of Scar Tissue (For Histology): absent Mohs Rapid Report Verbiage: The area of clinically evident tumor was marked with skin marking ink and appropriately hatched. The initial incision was made following the Mohs approach through the skin. The specimen was taken to the lab, divided into the necessary number of pieces, chromacoded and processed according to the Mohs protocol. This was repeated in successive stages until a tumor free defect was achieved. Manual Repair Warning Statement: We plan on removing the manually selected variable below in favor of our much easier automatic structured text blocks found in the previous tab. We decided to do this to help make the flow better and give you the full power of structured data. Manual selection is never going to be ideal in our platform and I would encourage you to avoid using manual selection from this point on, especially since I will be sunsetting this feature. It is important that you do one of two things with the customized text below. First, you can save all of the text in a word file so you can have it for future reference. Second, transfer the text to the appropriate area in the Library tab. Lastly, if there is a flap or graft type which we do not have you need to let us know right away so I can add it in before the variable is hidden. No need to panic, we plan to give you roughly 6 months to make the change. Eye Protection Verbiage: Before proceeding with the stage, a plastic scleral shield was inserted. The globe was anesthetized with a few drops of 1% lidocaine with 1:100,000 epinephrine. Then, an appropriate sized scleral shield was chosen and coated with lacrilube ointment. The shield was gently inserted and left in place for the duration of each stage. After the stage was completed, the shield was gently removed. Ear Wedge Repair Text: A wedge excision was completed by carrying down an excision through the full thickness of the ear and cartilage with an inward facing Burow's triangle. The wound was then closed in a layered fashion. Interpolation Flap Text: A decision was made to reconstruct the defect utilizing an interpolation axial flap and a staged reconstruction. A telfa template was made of the defect. This telfa template was then used to outline the interpolation flap. The donor area for the pedicle flap was then injected with anesthesia. The flap was excised through the skin and subcutaneous tissue down to the layer of the underlying musculature. The interpolation flap was carefully excised within this deep plane to maintain its blood supply. The edges of the donor site were undermined. The donor site was closed in a primary fashion. The pedicle was then rotated into position and sutured. Once the tube was sutured into place, adequate blood supply was confirmed with blanching and refill. The pedicle was then wrapped with xeroform gauze and dressed appropriately with a telfa and gauze bandage to ensure continued blood supply and protect the attached pedicle. Closure 4 Information: This tab is for additional flaps and grafts above and beyond our usual structured repairs.  Please note if you enter information here it will not currently bill and you will need to add the billing information manually. Complex Repair And Graft Additional Text (Will Appearing After The Standard Complex Repair Text): The complex repair was not sufficient to completely close the primary defect. The remaining additional defect was repaired with the graft mentioned below. Muscle Hinge Flap Text: The defect edges were debeveled with a #15 scalpel blade. Given the size, depth and location of the defect and the proximity to free margins a muscle hinge flap was deemed most appropriate. Using a sterile surgical marker, an appropriate hinge flap was drawn incorporating the defect. The area thus outlined was incised with a #15 scalpel blade. The skin margins were undermined to an appropriate distance in all directions utilizing iris scissors. Incidental Squamous Cell Carcinoma In Situ Histology Text: There is full-thickness keratinocytic atypia within the epidermis consistent with squamous cell carcinoma in situ. Partial Purse String (Simple) Text: Given the location of the defect and the characteristics of the surrounding skin a simple purse string closure was deemed most appropriate. Undermining was performed circumfirentially around the surgical defect. A purse string suture was then placed and tightened. Wound tension only allowed a partial closure of the circular defect. S Plasty Text: Given the location and shape of the defect, and the orientation of relaxed skin tension lines, an S-plasty was deemed most appropriate for repair. Using a sterile surgical marker, the appropriate outline of the S-plasty was drawn, incorporating the defect and placing the expected incisions within the relaxed skin tension lines where possible. The area thus outlined was incised deep to adipose tissue with a #15 scalpel blade. The skin margins were undermined to an appropriate distance in all directions utilizing iris scissors. The skin flaps were advanced over the defect. The opposing margins were then approximated with interrupted buried subcutaneous sutures. V-Y Flap Text: The defect edges were debeveled with a #15 scalpel blade. Given the location of the defect, shape of the defect and the proximity to free margins a V-Y flap was deemed most appropriate. Using a sterile surgical marker, an appropriate advancement flap was drawn incorporating the defect and placing the expected incisions within the relaxed skin tension lines where possible. The area thus outlined was incised deep to adipose tissue with a #15 scalpel blade. The skin margins were undermined to an appropriate distance in all directions utilizing iris scissors. Referred To Mid-Level For Closure Text (Leave Blank If You Do Not Want): After obtaining clear surgical margins the patient was sent to a mid-level provider for surgical repair. The patient understands they will receive post-surgical care and follow-up from the mid-level provider. Repair Performed By Another Provider Text (Leave Blank If You Do Not Want): After obtaining clear surgical margins the defect was repaired by another provider. Dorsal Nasal Flap Text: The defect edges were debeveled with a #15 scalpel blade. Given the location of the defect and the proximity to free margins a dorsal nasal flap was deemed most appropriate. Using a sterile surgical marker, an appropriate dorsal nasal flap was drawn around the defect. The area thus outlined was incised deep to adipose tissue with a #15 scalpel blade. The skin margins were undermined to an appropriate distance in all directions utilizing iris scissors. Location Indication Override (Is Already Calculated Based On Selected Body Location): Area H Anesthesia Volume In Cc: 4 Bilateral Helical Rim Advancement Flap Text: The defect edges were debeveled with a #15 blade scalpel. Given the location of the defect and the proximity to free margins (helical rim) a bilateral helical rim advancement flap was deemed most appropriate. Using a sterile surgical marker, the appropriate advancement flaps were drawn incorporating the defect and placing the expected incisions between the helical rim and antihelix where possible. The area thus outlined was incised through and through with a #15 scalpel blade. With a skin hook and iris scissors, the flaps were gently and sharply undermined and freed up. Epidermal Closure: running Surgeon/Pathologist Verbiage (Will Incorporate Name Of Surgeon From Intro If Not Blank): operated in two distinct and integrated capacities as the surgeon and pathologist. Cheek Interpolation Flap Text: A decision was made to reconstruct the defect utilizing an interpolation axial flap and a staged reconstruction. A telfa template was made of the defect. This telfa template was then used to outline the Cheek Interpolation flap. The donor area for the pedicle flap was then injected with anesthesia. The flap was excised through the skin and subcutaneous tissue down to the layer of the underlying musculature. The interpolation flap was carefully excised within this deep plane to maintain its blood supply. The edges of the donor site were undermined. The donor site was closed in a primary fashion. The pedicle was then rotated into position and sutured. Once the tube was sutured into place, adequate blood supply was confirmed with blanching and refill. The pedicle was then wrapped with xeroform gauze and dressed appropriately with a telfa and gauze bandage to ensure continued blood supply and protect the attached pedicle. Mastoid Interpolation Flap Text: A decision was made to reconstruct the defect utilizing an interpolation axial flap and a staged reconstruction. A telfa template was made of the defect. This telfa template was then used to outline the mastoid interpolation flap. The donor area for the pedicle flap was then injected with anesthesia. The flap was excised through the skin and subcutaneous tissue down to the layer of the underlying musculature. The pedicle flap was carefully excised within this deep plane to maintain its blood supply. The edges of the donor site were undermined. The donor site was closed in a primary fashion. The pedicle was then rotated into position and sutured. Once the tube was sutured into place, adequate blood supply was confirmed with blanching and refill. The pedicle was then wrapped with xeroform gauze and dressed appropriately with a telfa and gauze bandage to ensure continued blood supply and protect the attached pedicle. Consent 3/Introductory Paragraph: I gave the patient a chance to ask questions they had about the procedure. Following this I explained the Mohs procedure and consent was obtained. The risks, benefits and alternatives to therapy were discussed in detail. Specifically, the risks of infection, scarring, bleeding, prolonged wound healing, incomplete removal, allergy to anesthesia, nerve injury and recurrence were addressed. Prior to the procedure, the treatment site was clearly identified and confirmed by the patient. All components of Universal Protocol/PAUSE Rule completed. Postop Diagnosis: same Hatchet Flap Text: The defect edges were debeveled with a #15 scalpel blade. Given the location of the defect, shape of the defect and the proximity to free margins a hatchet flap was deemed most appropriate. Using a sterile surgical marker, an appropriate hatchet flap was drawn incorporating the defect and placing the expected incisions within the relaxed skin tension lines where possible. The area thus outlined was incised deep to adipose tissue with a #15 scalpel blade. The skin margins were undermined to an appropriate distance in all directions utilizing iris scissors. Xenograft Text: The defect edges were debeveled with a #15 scalpel blade. Given the location of the defect, shape of the defect and the proximity to free margins a xenograft was deemed most appropriate. The graft was then trimmed to fit the size of the defect. The graft was then placed in the primary defect and oriented appropriately. Dressing (No Sutures): dry sterile dressing Previous Accession (Optional): [id number] Consent (Near Eyelid Margin)/Introductory Paragraph: The rationale for Mohs was explained to the patient and consent was obtained. The risks, benefits and alternatives to therapy were discussed in detail. Specifically, the risks of ectropion or eyelid deformity, infection, scarring, bleeding, prolonged wound healing, incomplete removal, allergy to anesthesia, nerve injury and recurrence were addressed. Prior to the procedure, the treatment site was clearly identified and confirmed by the patient. All components of Universal Protocol/PAUSE Rule completed. Surgical Defect Width In Cm (Optional): 1.2 Partial Purse String (Intermediate) Text: Given the location of the defect and the characteristics of the surrounding skin an intermediate purse string closure was deemed most appropriate. Undermining was performed circumfirentially around the surgical defect. A purse string suture was then placed and tightened. Wound tension only allowed a partial closure of the circular defect. Consent (Ear)/Introductory Paragraph: The rationale for Mohs was explained to the patient and consent was obtained. The risks, benefits and alternatives to therapy were discussed in detail. Specifically, the risks of ear deformity, infection, scarring, bleeding, prolonged wound healing, incomplete removal, allergy to anesthesia, nerve injury and recurrence were addressed. Prior to the procedure, the treatment site was clearly identified and confirmed by the patient. All components of Universal Protocol/PAUSE Rule completed. Tarsorrhaphy Text: A tarsorrhaphy was performed using Frost sutures. Helical Rim Advancement Flap Text: The defect edges were debeveled with a #15 blade scalpel. Given the location of the defect and the proximity to free margins (helical rim) a double helical rim advancement flap was deemed most appropriate. Using a sterile surgical marker, the appropriate advancement flaps were drawn incorporating the defect and placing the expected incisions between the helical rim and antihelix where possible. The area thus outlined was incised through and through with a #15 scalpel blade. With a skin hook and iris scissors, the flaps were gently and sharply undermined and freed up. Consent 2/Introductory Paragraph: Mohs surgery was explained to the patient and consent was obtained. The risks, benefits and alternatives to therapy were discussed in detail. Specifically, the risks of infection, scarring, bleeding, prolonged wound healing, incomplete removal, allergy to anesthesia, nerve injury and recurrence were addressed. Prior to the procedure, the treatment site was clearly identified and confirmed by the patient. All components of Universal Protocol/PAUSE Rule completed. Ear Star Wedge Flap Text: The defect edges were debeveled with a #15 blade scalpel. Given the location of the defect and the proximity to free margins (helical rim) an ear star wedge flap was deemed most appropriate. Using a sterile surgical marker, the appropriate flap was drawn incorporating the defect and placing the expected incisions between the helical rim and antihelix where possible. The area thus outlined was incised through and through with a #15 scalpel blade. Alternatives Discussed Intro (Do Not Add Period): I discussed alternative treatments to Mohs surgery and specifically discussed the risks and benefits of O-L Flap Text: The defect edges were debeveled with a #15 scalpel blade. Given the location of the defect, shape of the defect and the proximity to free margins an O-L flap was deemed most appropriate. Using a sterile surgical marker, an appropriate advancement flap was drawn incorporating the defect and placing the expected incisions within the relaxed skin tension lines where possible. The area thus outlined was incised deep to adipose tissue with a #15 scalpel blade. The skin margins were undermined to an appropriate distance in all directions utilizing iris scissors. Paramedian Forehead Flap Text: A decision was made to reconstruct the defect utilizing an interpolation axial flap and a staged reconstruction. A telfa template was made of the defect. This telfa template was then used to outline the paramedian forehead pedicle flap. The donor area for the pedicle flap was then injected with anesthesia. The flap was excised through the skin and subcutaneous tissue down to the layer of the underlying musculature. The pedicle flap was carefully excised within this deep plane to maintain its blood supply. The edges of the donor site were undermined. The donor site was closed in a primary fashion. The pedicle was then rotated into position and sutured. Once the tube was sutured into place, adequate blood supply was confirmed with blanching and refill. The pedicle was then wrapped with xeroform gauze and dressed appropriately with a telfa and gauze bandage to ensure continued blood supply and protect the attached pedicle. O-T Plasty Text: The defect edges were debeveled with a #15 scalpel blade. Given the location of the defect, shape of the defect and the proximity to free margins an O-T plasty was deemed most appropriate. Using a sterile surgical marker, an appropriate O-T plasty was drawn incorporating the defect and placing the expected incisions within the relaxed skin tension lines where possible. The area thus outlined was incised deep to adipose tissue with a #15 scalpel blade. The skin margins were undermined to an appropriate distance in all directions utilizing iris scissors. Surgeon: Nitish Tellez MD Post-Care Instructions: I reviewed with the patient in detail post-care instructions. Patient is not to engage in any heavy lifting, exercise, or swimming for the next 14 days. Should the patient develop any fevers, chills, bleeding, severe pain patient will contact the office immediately. Inflammation Suggestive Of Cancer Camouflage Histology Text: There was a dense lymphocytic infiltrate which prevented adequate histologic evaluation of adjacent structures. Rotation Flap Text: The defect edges were debeveled with a #15 scalpel blade. Given the location of the defect, shape of the defect and the proximity to free margins a rotation flap was deemed most appropriate. Using a sterile surgical marker, an appropriate rotation flap was drawn incorporating the defect and placing the expected incisions within the relaxed skin tension lines where possible. The area thus outlined was incised deep to adipose tissue with a #15 scalpel blade. The skin margins were undermined to an appropriate distance in all directions utilizing iris scissors. Double Island Pedicle Flap Text: The defect edges were debeveled with a #15 scalpel blade. Given the location of the defect, shape of the defect and the proximity to free margins a double island pedicle advancement flap was deemed most appropriate. Using a sterile surgical marker, an appropriate advancement flap was drawn incorporating the defect, outlining the appropriate donor tissue and placing the expected incisions within the relaxed skin tension lines where possible. The area thus outlined was incised deep to adipose tissue with a #15 scalpel blade. The skin margins were undermined to an appropriate distance in all directions around the primary defect and laterally outward around the island pedicle utilizing iris scissors. There was minimal undermining beneath the pedicle flap. Consent (Lip)/Introductory Paragraph: The rationale for Mohs was explained to the patient and consent was obtained. The risks, benefits and alternatives to therapy were discussed in detail. Specifically, the risks of lip deformity, changes in the oral aperture, infection, scarring, bleeding, prolonged wound healing, incomplete removal, allergy to anesthesia, nerve injury and recurrence were addressed. Prior to the procedure, the treatment site was clearly identified and confirmed by the patient. All components of Universal Protocol/PAUSE Rule completed. Mohs Case Number: [id number] Ftsg Text: The defect edges were debeveled with a #15 scalpel blade. Given the location of the defect, shape of the defect and the proximity to free margins a full thickness skin graft was deemed most appropriate. Using a sterile surgical marker, the primary defect shape was transferred to the donor site. The area thus outlined was incised deep to adipose tissue with a #15 scalpel blade. The harvested graft was then trimmed of adipose tissue until only dermis and epidermis was left. The skin margins of the secondary defect were undermined to an appropriate distance in all directions utilizing iris scissors. The secondary defect was closed with interrupted buried subcutaneous sutures. The skin edges were then re-apposed with running  sutures. The skin graft was then placed in the primary defect and oriented appropriately. Purse String (Simple) Text: Given the location of the defect and the characteristics of the surrounding skin a pursestring closure was deemed most appropriate. Undermining was performed circumfirentially around the surgical defect. A purstring suture was then placed and tightened. Tumor Debulked?: curette Dermal Autograft Text: The defect edges were debeveled with a #15 scalpel blade. Given the location of the defect, shape of the defect and the proximity to free margins a dermal autograft was deemed most appropriate. Using a sterile surgical marker, the primary defect shape was transferred to the donor site. The area thus outlined was incised deep to adipose tissue with a #15 scalpel blade. The harvested graft was then trimmed of adipose and epidermal tissue until only dermis was left. The skin graft was then placed in the primary defect and oriented appropriately. Melolabial Transposition Flap Text: The defect edges were debeveled with a #15 scalpel blade. Given the location of the defect and the proximity to free margins a melolabial flap was deemed most appropriate. Using a sterile surgical marker, an appropriate melolabial transposition flap was drawn incorporating the defect. The area thus outlined was incised deep to adipose tissue with a #15 scalpel blade. The skin margins were undermined to an appropriate distance in all directions utilizing iris scissors. Bcc  Morpheaform/Sclerosing Histology Text: There are uniform nodular masses of basaloid neoplastic cells with scanty cytoplasm and peripheral palisading with a loose fibrous stroma.  The appearance is typical for a morpheaform basal cell carcinoma Hemostasis: Electrocautery Consent Type: Consent 1 (Standard) Surgeon Performing Repair (Optional): Dr. Juancarlos Sharma A-T Advancement Flap Text: The defect edges were debeveled with a #15 scalpel blade. Given the location of the defect, shape of the defect and the proximity to free margins an A-T advancement flap was deemed most appropriate. Using a sterile surgical marker, an appropriate advancement flap was drawn incorporating the defect and placing the expected incisions within the relaxed skin tension lines where possible. The area thus outlined was incised deep to adipose tissue with a #15 scalpel blade. The skin margins were undermined to an appropriate distance in all directions utilizing iris scissors. Epidermal Sutures: 5-0 Prolene W Plasty Text: The lesion was extirpated to the level of the fat with a #15 scalpel blade. Given the location of the defect, shape of the defect and the proximity to free margins a W-plasty was deemed most appropriate for repair. Using a sterile surgical marker, the appropriate transposition arms of the W-plasty were drawn incorporating the defect and placing the expected incisions within the relaxed skin tension lines where possible. The area thus outlined was incised deep to adipose tissue with a #15 scalpel blade. The skin margins were undermined to an appropriate distance in all directions utilizing iris scissors. The opposing transposition arms were then transposed into place in opposite direction and anchored with interrupted buried subcutaneous sutures. Referred To Otolaryngology For Closure Text (Leave Blank If You Do Not Want): After obtaining clear surgical margins the patient was sent to otolaryngology for surgical repair. The patient understands they will receive post-surgical care and follow-up from the referring physician's office. Island Pedicle Flap Text: The defect edges were debeveled with a #15 scalpel blade. Given the location of the defect, shape of the defect and the proximity to free margins an island pedicle advancement flap was deemed most appropriate. Using a sterile surgical marker, an appropriate advancement flap was drawn incorporating the defect, outlining the appropriate donor tissue and placing the expected incisions within the relaxed skin tension lines where possible. The area thus outlined was incised deep to adipose tissue with a #15 scalpel blade. The skin margins were undermined to an appropriate distance in all directions around the primary defect and laterally outward around the island pedicle utilizing iris scissors. There was minimal undermining beneath the pedicle flap. Cheek-To-Nose Interpolation Flap Text: A decision was made to reconstruct the defect utilizing an interpolation axial flap and a staged reconstruction. A telfa template was made of the defect. This telfa template was then used to outline the Cheek-To-Nose Interpolation flap. The donor area for the pedicle flap was then injected with anesthesia. The flap was excised through the skin and subcutaneous tissue down to the layer of the underlying musculature. The interpolation flap was carefully excised within this deep plane to maintain its blood supply. The edges of the donor site were undermined. The donor site was closed in a primary fashion. The pedicle was then rotated into position and sutured. Once the tube was sutured into place, adequate blood supply was confirmed with blanching and refill. The pedicle was then wrapped with xeroform gauze and dressed appropriately with a telfa and gauze bandage to ensure continued blood supply and protect the attached pedicle. No Repair - Repaired With Adjacent Surgical Defect Text (Leave Blank If You Do Not Want): After obtaining clear surgical margins the defect was repaired concurrently with another surgical defect which was in close approximation. Bcc Histology Text: Beneath an irregular epidermis, there are small nodular masses of basaloid neoplastic cells with nuclear pleomorphism attached to the basal layer and surrounded by a loose fibrous stroma and mild inflammation in the dermis. The findings are typical for a basal cell carcinoma. Burow's Advancement Flap Text: The defect edges were debeveled with a #15 scalpel blade. Given the location of the defect and the proximity to free margins a Burow's advancement flap was deemed most appropriate. Using a sterile surgical marker, the appropriate advancement flap was drawn incorporating the defect and placing the expected incisions within the relaxed skin tension lines where possible. The area thus outlined was incised deep to adipose tissue with a #15 scalpel blade. The skin margins were undermined to an appropriate distance in all directions utilizing iris scissors. Body Location Override (Optional - Billing Will Still Be Based On Selected Body Map Location If Applicable): Lower Mid Lip Full Thickness Lip Wedge Repair (Flap) Text: Given the location of the defect and the proximity to free margins a full thickness wedge repair was deemed most appropriate. Using a sterile surgical marker, the appropriate repair was drawn incorporating the defect and placing the expected incisions perpendicular to the vermilion border. The vermilion border was also meticulously outlined to ensure appropriate reapproximation during the repair. The area thus outlined was incised through and through with a #15 scalpel blade. The muscularis and dermis were reaproximated with deep sutures following hemostasis. Care was taken to realign the vermilion border before proceeding with the superficial closure. Once the vermilion was realigned the superfical and mucosal closure was finished. Medical Necessity Statement: Based on my medical judgement, Mohs surgery is the most appropriate treatment for this cancer compared to other treatments. Area H Indication Text: Tumors in this location are included in Area H (eyelids, eyebrows, nose, lips, chin, ear, pre-auricular, post-auricular, temple, genitalia, hands, feet, ankles and areola). Tissue conservation is critical in these anatomic locations. Purse String (Intermediate) Text: Given the location of the defect and the characteristics of the surrounding skin a pursestring intermediate closure was deemed most appropriate. Undermining was performed circumfirentially around the surgical defect. A purstring suture was then placed and tightened. Melolabial Interpolation Flap Text: A decision was made to reconstruct the defect utilizing an interpolation axial flap and a staged reconstruction. A telfa template was made of the defect. This telfa template was then used to outline the melolabial interpolation flap. The donor area for the pedicle flap was then injected with anesthesia. The flap was excised through the skin and subcutaneous tissue down to the layer of the underlying musculature. The pedicle flap was carefully excised within this deep plane to maintain its blood supply. The edges of the donor site were undermined. The donor site was closed in a primary fashion. The pedicle was then rotated into position and sutured. Once the tube was sutured into place, adequate blood supply was confirmed with blanching and refill. The pedicle was then wrapped with xeroform gauze and dressed appropriately with a telfa and gauze bandage to ensure continued blood supply and protect the attached pedicle. Consent (Temporal Branch)/Introductory Paragraph: The rationale for Mohs was explained to the patient and consent was obtained. The risks, benefits and alternatives to therapy were discussed in detail. Specifically, the risks of damage to the temporal branch of the facial nerve, infection, scarring, bleeding, prolonged wound healing, incomplete removal, allergy to anesthesia, and recurrence were addressed. Prior to the procedure, the treatment site was clearly identified and confirmed by the patient. All components of Universal Protocol/PAUSE Rule completed. Referring Physician (Optional): Dr. Nakul Soria Date Of Previous Biopsy (Optional): 3/21/18 No Residual Tumor Seen Histology Text: There were no malignant cells seen in the sections examined. Transposition Flap Text: The defect edges were debeveled with a #15 scalpel blade. Given the location of the defect and the proximity to free margins a transposition flap was deemed most appropriate. Using a sterile surgical marker, an appropriate transposition flap was drawn incorporating the defect. The area thus outlined was incised deep to adipose tissue with a #15 scalpel blade. The skin margins were undermined to an appropriate distance in all directions utilizing iris scissors. Scc Histology Text: There are islands of atypical squamous cells invading the dermis in addition to full thickness keratinocytic atypia within the epidermis. The findings are consistent with squamous cell carcinoma. Dressing: pressure dressing Estimated Blood Loss (Cc): minimal Afx Histology Text: there is a poorly differentiated spindled cell neoplasm composed of fascicles of atypical spindled and epithelioid cells, infiltrating and replacing papillary and reticular dermis. Mitotic activity is brisk, including atypical forms. The lesion is present on a sun-damaged skin. This pattern supports the diagnosis of atypical fibrous xanthoma. Scc Ka Subtype Histology Text: There is a cup-shaped exoendophytic epithelial neoplasm characterized by proliferations of keratinocytes with abundant eosinophilic glossy cytoplasm and nuclei with open chromatin in the papillary and upper reticular dermis. Multiple inclusions containing elastic material are seen within the cytoplasm. The features are of squamous cell carcinoma, keratoacanthoma type. Incidental Actinic Keratosis Histology Text: There is atypia of the keratinocytes in the basal layer of the epidermis, consistent with an actinic keratosis. X Size Of Lesion In Cm (Optional): 1 Bi-Rhombic Flap Text: The defect edges were debeveled with a #15 scalpel blade. Given the location of the defect and the proximity to free margins a bi-rhombic flap was deemed most appropriate. Using a sterile surgical marker, an appropriate rhombic flap was drawn incorporating the defect. The area thus outlined was incised deep to adipose tissue with a #15 scalpel blade. The skin margins were undermined to an appropriate distance in all directions utilizing iris scissors. Surgical Defect Length In Cm (Optional): 0.8 Mixed Nodular And Infiltrative Bcc Histology Text: There are irregular nests of pleomorphic, hyperchromatic basaloid cells with peripheral palisading infiltrate the tissue in a diffuse fashion in association with sclerotic stroma. Tissue Cultured Epidermal Autograft Text: The defect edges were debeveled with a #15 scalpel blade. Given the location of the defect, shape of the defect and the proximity to free margins a tissue cultured epidermal autograft was deemed most appropriate. The graft was then trimmed to fit the size of the defect. The graft was then placed in the primary defect and oriented appropriately. Advancement Flap (Single) Text: The defect edges were debeveled with a #15 scalpel blade. Given the location of the defect and the proximity to free margins a single advancement flap was deemed most appropriate. Using a sterile surgical marker, an appropriate advancement flap was drawn incorporating the defect and placing the expected incisions within the relaxed skin tension lines where possible. The area thus outlined was incised deep to adipose tissue with a #15 scalpel blade. The skin margins were undermined to an appropriate distance in all directions utilizing iris scissors. Mohs Histo Method Verbiage: Each section was then chromacoded and processed in the Mohs lab using the Mohs protocol and submitted for frozen section. Cheiloplasty (Complex) Text: A decision was made to reconstruct the defect with a  cheiloplasty. The defect was undermined extensively. Additional obicularis oris muscle was excised with a 15 blade scalpel. The defect was converted into a full thickness wedge to facilite a better cosmetic result. Small vessels were then tied off with 5-0 monocyrl. The obicularis oris, superficial fascia, adipose and dermis were then reapproximated. After the deeper layers were approximated the epidermis was reapproximated with particular care given to realign the vermilion border. Consent (Spinal Accessory)/Introductory Paragraph: The rationale for Mohs was explained to the patient and consent was obtained. The risks, benefits and alternatives to therapy were discussed in detail. Specifically, the risks of damage to the spinal accessory nerve, infection, scarring, bleeding, prolonged wound healing, incomplete removal, allergy to anesthesia, and recurrence were addressed. Prior to the procedure, the treatment site was clearly identified and confirmed by the patient. All components of Universal Protocol/PAUSE Rule completed. Bcc Infiltrative Histology Text: There are nests of atypical basophilic cells present within the fibrotic dermis displaying an infiltrative pattern of invasion. Histology Selection Override (Optional- Will Default To Parent Diagnosis If N/A): Squamous Cell Carcinoma Donor Site Anesthesia Type: same as repair anesthesia Scc Poorly Differentiated Histology Text: There are strands and nests of pleomorphic cells present in an infiltrative pattern. Mitotic activity is brisk. There is vascular proliferation and acute and chronic inflammation in the dermis. The findings are those of a poorly differentiated squamous cell carcinoma. Detail Level: Simple Area L Indication Text: Tumors in this location are included in Area L (trunk and extremities). Mohs surgery is indicated for larger tumors, or tumors with aggressive histologic features, in these anatomic locations. Mohs Method Verbiage: An incision at a 45 degree angle following the standard Mohs approach was done and the specimen was harvested as a microscopic controlled layer. Bcc Superficial Histology Text: There are nests of atypical basophilic cells present right below or budding off the epidermis with skip areas. Stromal changes and retraction artifacts are noted. The findings are consistent with a superficial basal cell carcinoma. Complex Repair And Flap Additional Text (Will Appearing After The Standard Complex Repair Text): The complex repair was not sufficient to completely close the primary defect. The remaining additional defect was repaired with the flap mentioned below. Secondary Intention Text (Leave Blank If You Do Not Want): The defect will heal with secondary intention. Unna Boot Text: An Unna boot was placed to help immobilize the limb and facilitate more rapid healing. Subsequent Stages Histo Method Verbiage: Using a similar technique to that described above, a thin layer of tissue was removed from all areas where tumor was visible on the previous stage. The tissue was again oriented, mapped, dyed, and processed as above. Mauc Instructions: By selecting yes to the question below the [facility] number will be added into the note. This will be calculated automatically based on the diagnosis chosen, the size entered, the body zone selected (H,M,L) and the specific indications you chose. You will also have the option to override the Mohs AUC if you disagree with the automatically calculated number and this option is found in the Case Summary tab. Referred To Asc For Closure Text (Leave Blank If You Do Not Want): After obtaining clear surgical margins the patient was sent to an [facility] for surgical repair. The patient understands they will receive post-surgical care and follow-up from the [facility] physician. Cartilage Graft Text: The defect edges were debeveled with a #15 scalpel blade. Given the location of the defect, shape of the defect, the fact the defect involved a full thickness cartilage defect a cartilage graft was deemed most appropriate. An appropriate donor site was identified, cleansed, and anesthetized. The cartilage graft was then harvested and transferred to the recipient site, oriented appropriately and then sutured into place. The secondary defect was then repaired using a primary closure. Trilobed Flap Text: The defect edges were debeveled with a #15 scalpel blade. Given the location of the defect and the proximity to free margins a trilobed flap was deemed most appropriate. Using a sterile surgical marker, an appropriate trilobed flap drawn around the defect. The area thus outlined was incised deep to adipose tissue with a #15 scalpel blade. The skin margins were undermined to an appropriate distance in all directions utilizing iris scissors. Mucosal Advancement Flap Text: Given the location of the defect, shape of the defect and the proximity to free margins a mucosal advancement flap was deemed most appropriate. Incisions were made with a 15 blade scalpel in the appropriate fashion along the cutaneous vermilion border and the mucosal lip. The remaining actinically damaged mucosal tissue was excised. The mucosal advancement flap was then elevated to the gingival sulcus with care taken to preserve the neurovascular structures and advanced into the primary defect. Care was taken to ensure that precise realignment of the vermilion border was achieved. V-Y Plasty Text: The defect edges were debeveled with a #15 scalpel blade. Given the location of the defect, shape of the defect and the proximity to free margins an V-Y advancement flap was deemed most appropriate. Using a sterile surgical marker, an appropriate advancement flap was drawn incorporating the defect and placing the expected incisions within the relaxed skin tension lines where possible. The area thus outlined was incised deep to adipose tissue with a #15 scalpel blade. The skin margins were undermined to an appropriate distance in all directions utilizing iris scissors. Incidental Superficial Basal Cell Carcinoma Histology Text: There are nests of atypical basophilic cells present right below or budding off the epidermis with skip areas. Stromal changes and retraction artifacts are noted, consistent with a superficial basal cell carcinoma. Graft Basting Suture (Optional): 5-0 Fast Absorbing Gut O-Z Plasty Text: The defect edges were debeveled with a #15 scalpel blade. Given the location of the defect, shape of the defect and the proximity to free margins an O-Z plasty (double transposition flap) was deemed most appropriate. Using a sterile surgical marker, the appropriate transposition flaps were drawn incorporating the defect and placing the expected incisions within the relaxed skin tension lines where possible. The area thus outlined was incised deep to adipose tissue with a #15 scalpel blade. The skin margins were undermined to an appropriate distance in all directions utilizing iris scissors. Hemostasis was achieved with electrocautery. The flaps were then transposed into place, one clockwise and the other counterclockwise, and anchored with interrupted buried subcutaneous sutures.

## 2024-04-17 NOTE — DISCHARGE NOTE NURSING/CASE MANAGEMENT/SOCIAL WORK - PATIENT PORTAL LINK FT
You can access the FollowMyHealth Patient Portal offered by [facility] by registering at the following website: [email or web address]. By joining [facility]’s FollowMyHealth portal, you will also be able to view your health information using other applications (apps) compatible with our system.

## 2024-04-17 NOTE — DISCHARGE NOTE PROVIDER - HOSPITAL COURSE
89yo female whose PMH includes arthritis, depression, CAD (MI), and HTN presents to the ER due to worsening right lower back pain which began about a week ago after she "threw her back out",    1. Acute on top of chronic back pain due to vertebral compression fracture L1- L3   * pt reported that she lives by herself   - Admit to medicine   - Pain meds prn              - laxative . DC on pain meds with laxative   *  per conversation with ER, neurosurgery does not need patient transferred to [place].  - CT spine:  a) Moderate compression fracture deformity noted of L3 without associated bony retropulsion as well as mild compression deformity of the L1 superior endplate.  b) Multilevel degenerative changes and disc bulging most significant at the L3-L4 level contributing to moderate spinal stenosis as well as severe left foraminal narrowing.  - PT eval:  a) Need rehab     2. hx of caldwell esophagus  - Cont home meds     3. CAD - continue metoprolol and Plavix    4.History of depression - Paroxetine    5. Arthritis - noted    6. HTN - monitor on metoprolol      7. Constipation resolved  - Cont laxative 89yo female whose PMH includes arthritis, depression, CAD (MI), and HTN presents to the ER due to worsening right lower back pain which began about a week ago after she "threw her back out",    1. Acute on top of chronic back pain due to vertebral compression fracture L1- L3   * pt reported that she lives by herself   - Admit to medicine   - Pain meds prn              - laxative . DC on pain meds with laxative   *  per conversation with ER, neurosurgery does not need patient transferred to [place].  - TLSO brace with immobilizer for L1-L3   - CT spine:  a) Moderate compression fracture deformity noted of L3 without associated bony retropulsion as well as mild compression deformity of the L1 superior endplate.  b) Multilevel degenerative changes and disc bulging most significant at the L3-L4 level contributing to moderate spinal stenosis as well as severe left foraminal narrowing.  - PT eval:  a) Need rehab     2. hx of caldwell esophagus  - Cont home meds     3. CAD - continue metoprolol and Plavix    4.History of depression - Paroxetine    5. Arthritis - noted    6. HTN - monitor on metoprolol      7. Constipation   - Cont laxative

## 2024-04-17 NOTE — DISCHARGE NOTE PROVIDER - CARE PROVIDER_API CALL
Galen Moralez  Internal Medicine  [address] [place] [address]  Phone: [phone]  Fax: [phone]  Established Patient  Follow Up Time: 1 week

## 2024-04-17 NOTE — DISCHARGE NOTE PROVIDER - ATTENDING DISCHARGE PHYSICAL EXAMINATION:
VITALS:   T(C): 35.9 (04-17-24 @ 04:48), Max: 36.2 (04-16-24 @ 21:15)  HR: 76 (04-17-24 @ 04:48) (72 - 81)  BP: 116/56 (04-17-24 @ 04:48) (116/56 - 142/64)  RR: 18 (04-17-24 @ 04:48) (17 - 18)  SpO2: 96% (04-16-24 @ 21:15) (96% - 98%)    GENERAL: NAD, lying in bed comfortably  HEART: Regular rate and rhythm  LUNGS: Unlabored respirations.  Clear to auscultation bilaterally  ABDOMEN: Soft, nontender, nondistended, +BS  EXTREMITIES: 2+ peripheral pulses bilaterally  NERVOUS SYSTEM:  A&Ox3,

## 2024-04-24 DIAGNOSIS — I25.10 ATHEROSCLEROTIC HEART DISEASE OF NATIVE CORONARY ARTERY WITHOUT ANGINA PECTORIS: ICD-10-CM

## 2024-04-24 DIAGNOSIS — K59.00 CONSTIPATION, UNSPECIFIED: ICD-10-CM

## 2024-04-24 DIAGNOSIS — M19.90 UNSPECIFIED OSTEOARTHRITIS, UNSPECIFIED SITE: ICD-10-CM

## 2024-04-24 DIAGNOSIS — I25.2 OLD MYOCARDIAL INFARCTION: ICD-10-CM

## 2024-04-24 DIAGNOSIS — M48.56XA COLLAPSED VERTEBRA, NOT ELSEWHERE CLASSIFIED, LUMBAR REGION, INITIAL ENCOUNTER FOR FRACTURE: ICD-10-CM

## 2024-04-24 DIAGNOSIS — M48.061 SPINAL STENOSIS, LUMBAR REGION WITHOUT NEUROGENIC CLAUDICATION: ICD-10-CM

## 2024-04-24 DIAGNOSIS — Z79.02 LONG TERM (CURRENT) USE OF ANTITHROMBOTICS/ANTIPLATELETS: ICD-10-CM

## 2024-04-24 DIAGNOSIS — I10 ESSENTIAL (PRIMARY) HYPERTENSION: ICD-10-CM

## 2024-04-24 DIAGNOSIS — F32.A DEPRESSION, UNSPECIFIED: ICD-10-CM

## 2024-04-24 DIAGNOSIS — Z88.0 ALLERGY STATUS TO PENICILLIN: ICD-10-CM

## 2024-04-24 DIAGNOSIS — K22.70 BARRETT'S ESOPHAGUS WITHOUT DYSPLASIA: ICD-10-CM

## 2024-05-04 NOTE — DISCHARGE NOTE PROVIDER - NSDCCPCAREPLAN_GEN_ALL_CORE_FT
2 = A lot of assistance
PRINCIPAL DISCHARGE DIAGNOSIS  Diagnosis: Hip fracture, left  Assessment and Plan of Treatment: You were admitted to the hospital for a fall, noted to have a left hip fracture,  you had left hip hemiarthroplasty with orthopedic surgeons on 10/22/21. You will be disharged to Short term rehab for physical therapy. Please follow up with your surgeon 2 weeks post discharge.

## 2024-08-13 NOTE — DISCHARGE NOTE NURSING/CASE MANAGEMENT/SOCIAL WORK - FLU SEASON?
Alisson from [facility] in [facility] calling to obtain fax # for medical records for patient.    Patient was seen by them in May and July and has follow up scheduled for October with primary provider.    I informed Alisson that patient was seen here in July and has follow up in October with primary provider.    Patient has a [place] address on file with the clinic and [place].  Patient has a [place] address on file with  and [place] Medicaid.    They are going to contact patient to clarify which information is correct and what primary provider she will continue with.  
Left message for patient to return our office call regarding verification of address and insurance.  Call back # provided.  
Patient returned our office call regarding the below.  Patient stated that she moved back to [place] and has [place].  Patient will be establishing with a [place] provider and would like her appointment with Dr. Duff in October cancelled.    
Yes...

## 2024-10-13 NOTE — DISCHARGE NOTE PROVIDER - NSDCMRMEDTOKEN_GEN_ALL_CORE_FT
Problem: Adult Inpatient Plan of Care  Goal: Plan of Care Review  Outcome: Progressing  Goal: Patient-Specific Goal (Individualized)  Outcome: Progressing  Goal: Absence of Hospital-Acquired Illness or Injury  Outcome: Progressing  Intervention: Identify and Manage Fall Risk  Recent Flowsheet Documentation  Taken 10/13/2024 1600 by Judy Arreola RN  Safety Promotion/Fall Prevention:   clutter free environment maintained   fall prevention program maintained   nonskid shoes/slippers when out of bed   room organization consistent   safety round/check completed  Taken 10/13/2024 1400 by Judy Arreola RN  Safety Promotion/Fall Prevention:   activity supervised   fall prevention program maintained   nonskid shoes/slippers when out of bed   room organization consistent   safety round/check completed  Taken 10/13/2024 1200 by Judy Arreola RN  Safety Promotion/Fall Prevention:   activity supervised   fall prevention program maintained   nonskid shoes/slippers when out of bed   room organization consistent   safety round/check completed  Taken 10/13/2024 1000 by Judy Arreola RN  Safety Promotion/Fall Prevention:   activity supervised   clutter free environment maintained   fall prevention program maintained   nonskid shoes/slippers when out of bed   room organization consistent   safety round/check completed  Taken 10/13/2024 0800 by Judy Arreola RN  Safety Promotion/Fall Prevention:   activity supervised   clutter free environment maintained   fall prevention program maintained   nonskid shoes/slippers when out of bed   room organization consistent   safety round/check completed  Intervention: Prevent Skin Injury  Recent Flowsheet Documentation  Taken 10/13/2024 1600 by Judy Arreola RN  Body Position: position changed independently  Skin Protection:   incontinence pads utilized   skin sealant/moisture barrier applied   transparent dressing maintained  Taken 10/13/2024 1400 by Judy Arreola RN  Body Position:  position changed independently  Skin Protection:   incontinence pads utilized   skin sealant/moisture barrier applied   transparent dressing maintained  Taken 10/13/2024 1200 by Judy Arreola RN  Body Position: position changed independently  Skin Protection:   incontinence pads utilized   skin sealant/moisture barrier applied   transparent dressing maintained  Taken 10/13/2024 1000 by Judy Arreola RN  Body Position: position changed independently  Skin Protection:   incontinence pads utilized   skin sealant/moisture barrier applied   transparent dressing maintained  Taken 10/13/2024 0800 by Judy Arreola RN  Body Position: position changed independently  Skin Protection:   incontinence pads utilized   skin sealant/moisture barrier applied   transparent dressing maintained  Intervention: Prevent Infection  Recent Flowsheet Documentation  Taken 10/13/2024 1600 by Judy Arreola RN  Infection Prevention:   environmental surveillance performed   hand hygiene promoted   rest/sleep promoted   single patient room provided  Taken 10/13/2024 1400 by Judy Arreola RN  Infection Prevention:   environmental surveillance performed   hand hygiene promoted   rest/sleep promoted   single patient room provided  Taken 10/13/2024 1200 by Judy Arreola RN  Infection Prevention:   environmental surveillance performed   hand hygiene promoted   rest/sleep promoted   single patient room provided  Taken 10/13/2024 1000 by Judy Arreola RN  Infection Prevention:   environmental surveillance performed   hand hygiene promoted   rest/sleep promoted   single patient room provided  Taken 10/13/2024 0800 by Judy Arreola RN  Infection Prevention:   environmental surveillance performed   hand hygiene promoted   rest/sleep promoted   single patient room provided  Goal: Optimal Comfort and Wellbeing  Outcome: Progressing  Intervention: Provide Person-Centered Care  Recent Flowsheet Documentation  Taken 10/13/2024 1600 by Judy Arreola  RN  Trust Relationship/Rapport:   care explained   emotional support provided   questions answered   reassurance provided   thoughts/feelings acknowledged  Taken 10/13/2024 1400 by Judy Arreola RN  Trust Relationship/Rapport:   care explained   emotional support provided   questions answered   reassurance provided   thoughts/feelings acknowledged  Taken 10/13/2024 1200 by Judy Arreola RN  Trust Relationship/Rapport:   care explained   emotional support provided   questions answered   reassurance provided   thoughts/feelings acknowledged  Taken 10/13/2024 1000 by Judy Arreola RN  Trust Relationship/Rapport:   care explained   emotional support provided   empathic listening provided   questions answered   reassurance provided   thoughts/feelings acknowledged  Taken 10/13/2024 0800 by Judy Arreola RN  Trust Relationship/Rapport:   care explained   emotional support provided   questions answered   reassurance provided   thoughts/feelings acknowledged  Goal: Readiness for Transition of Care  Outcome: Progressing     Problem: Heart Failure  Goal: Optimal Coping  Outcome: Progressing  Intervention: Support Psychosocial Response  Recent Flowsheet Documentation  Taken 10/13/2024 1600 by Judy Arreola RN  Supportive Measures:   active listening utilized   positive reinforcement provided   relaxation techniques promoted   self-care encouraged   self-responsibility promoted   verbalization of feelings encouraged  Taken 10/13/2024 1400 by Judy Arreola RN  Supportive Measures:   active listening utilized   positive reinforcement provided   relaxation techniques promoted   self-care encouraged   self-responsibility promoted   verbalization of feelings encouraged  Taken 10/13/2024 1200 by Judy Arreola RN  Supportive Measures:   active listening utilized   positive reinforcement provided   relaxation techniques promoted   self-care encouraged   self-responsibility promoted   verbalization of feelings encouraged  Taken  clopidogrel 75 mg oral tablet: 1 tab(s) orally once a day  metoprolol tartrate 25 mg oral tablet: 1 tab(s) orally 2 times a day  PARoxetine 10 mg oral tablet: 1 tab(s) orally   10/13/2024 1000 by Judy Arreola RN  Supportive Measures:   active listening utilized   self-care encouraged   self-reflection promoted   verbalization of feelings encouraged  Taken 10/13/2024 0800 by Judy Arreola RN  Supportive Measures:   active listening utilized   positive reinforcement provided   relaxation techniques promoted   self-care encouraged   self-responsibility promoted   verbalization of feelings encouraged  Goal: Optimal Cardiac Output and Blood Flow  Outcome: Progressing  Intervention: Optimize Cardiac Output  Recent Flowsheet Documentation  Taken 10/13/2024 1600 by Judy Arreola RN  Environmental Support: calm environment promoted  Taken 10/13/2024 1400 by Judy Arreola RN  Environmental Support: calm environment promoted  Taken 10/13/2024 1200 by Judy Arreola RN  Environmental Support:   calm environment promoted   rest periods encouraged  Taken 10/13/2024 1000 by Judy Arreola RN  Environmental Support:   calm environment promoted   rest periods encouraged  Taken 10/13/2024 0800 by Judy Arreola RN  Environmental Support:   calm environment promoted   environmental consistency promoted   rest periods encouraged  Goal: Stable Heart Rate and Rhythm  Outcome: Progressing  Goal: Fluid and Electrolyte Balance  Outcome: Progressing  Goal: Optimal Functional Ability  Outcome: Progressing  Intervention: Optimize Functional Ability  Recent Flowsheet Documentation  Taken 10/13/2024 1600 by Judy Arreola RN  Activity Management: up ad rik  Self-Care Promotion:   independence encouraged   BADL personal objects within reach   BADL personal routines maintained  Taken 10/13/2024 1400 by Judy Arreola RN  Activity Management: (on couch)   up in chair   other (see comments)  Self-Care Promotion:   independence encouraged   BADL personal objects within reach   BADL personal routines maintained   adaptive equipment use encouraged  Taken 10/13/2024 1200 by Judy Arreola RN  Activity Management: up in  chair  Self-Care Promotion:   independence encouraged   BADL personal objects within reach   BADL personal routines maintained   adaptive equipment use encouraged  Taken 10/13/2024 1000 by Judy Arreola RN  Self-Care Promotion:   independence encouraged   BADL personal objects within reach   BADL personal routines maintained   adaptive equipment use encouraged  Taken 10/13/2024 0800 by Judy Arreola RN  Activity Management: activity minimized  Self-Care Promotion:   independence encouraged   BADL personal objects within reach   BADL personal routines maintained   adaptive equipment use encouraged  Goal: Improved Oral Intake  Outcome: Progressing  Goal: Effective Oxygenation and Ventilation  Outcome: Progressing  Intervention: Promote Airway Secretion Clearance  Recent Flowsheet Documentation  Taken 10/13/2024 1600 by Judy Arreola RN  Activity Management: up ad rik  Taken 10/13/2024 1400 by Judy Arreola RN  Activity Management: (on couch)   up in chair   other (see comments)  Taken 10/13/2024 1200 by Judy Arreola RN  Activity Management: up in chair  Taken 10/13/2024 0800 by Judy Arreola RN  Activity Management: activity minimized  Cough And Deep Breathing: done independently per patient  Intervention: Optimize Oxygenation and Ventilation  Recent Flowsheet Documentation  Taken 10/13/2024 1600 by Judy Arreola RN  Head of Bed (HOB) Positioning: HOB elevated  Taken 10/13/2024 1400 by Judy Arreola RN  Head of Bed (HOB) Positioning: HOB elevated  Taken 10/13/2024 1200 by Judy Arreola RN  Head of Bed (HOB) Positioning: HOB elevated  Taken 10/13/2024 1000 by Judy Arreola RN  Head of Bed (HOB) Positioning: HOB elevated  Taken 10/13/2024 0800 by Judy Arreola RN  Head of Bed (HOB) Positioning: HOB elevated  Goal: Effective Breathing Pattern During Sleep  Outcome: Progressing  Intervention: Monitor and Manage Obstructive Sleep Apnea  Recent Flowsheet Documentation  Taken 10/13/2024 1600 by Judy Arreola  RN  Medication Review/Management: medications reviewed  Taken 10/13/2024 0800 by Judy Arreola RN  Medication Review/Management: medications reviewed     Problem: Fall Injury Risk  Goal: Absence of Fall and Fall-Related Injury  Outcome: Progressing  Intervention: Identify and Manage Contributors  Recent Flowsheet Documentation  Taken 10/13/2024 1600 by Judy Arreola RN  Medication Review/Management: medications reviewed  Self-Care Promotion:   independence encouraged   BADL personal objects within reach   BADL personal routines maintained  Taken 10/13/2024 1400 by Judy Arreola RN  Self-Care Promotion:   independence encouraged   BADL personal objects within reach   BADL personal routines maintained   adaptive equipment use encouraged  Taken 10/13/2024 1200 by Judy Arreola RN  Self-Care Promotion:   independence encouraged   BADL personal objects within reach   BADL personal routines maintained   adaptive equipment use encouraged  Taken 10/13/2024 1000 by Judy Arreola RN  Self-Care Promotion:   independence encouraged   BADL personal objects within reach   BADL personal routines maintained   adaptive equipment use encouraged  Taken 10/13/2024 0800 by Judy Arreola RN  Medication Review/Management: medications reviewed  Self-Care Promotion:   independence encouraged   BADL personal objects within reach   BADL personal routines maintained   adaptive equipment use encouraged  Intervention: Promote Injury-Free Environment  Recent Flowsheet Documentation  Taken 10/13/2024 1600 by Judy Arreola RN  Safety Promotion/Fall Prevention:   clutter free environment maintained   fall prevention program maintained   nonskid shoes/slippers when out of bed   room organization consistent   safety round/check completed  Taken 10/13/2024 1400 by Judy Arreola RN  Safety Promotion/Fall Prevention:   activity supervised   fall prevention program maintained   nonskid shoes/slippers when out of bed   room organization  consistent   safety round/check completed  Taken 10/13/2024 1200 by Judy Arreola, RN  Safety Promotion/Fall Prevention:   activity supervised   fall prevention program maintained   nonskid shoes/slippers when out of bed   room organization consistent   safety round/check completed  Taken 10/13/2024 1000 by Judy Arreola, RN  Safety Promotion/Fall Prevention:   activity supervised   clutter free environment maintained   fall prevention program maintained   nonskid shoes/slippers when out of bed   room organization consistent   safety round/check completed  Taken 10/13/2024 0800 by Judy Arreola, RN  Safety Promotion/Fall Prevention:   activity supervised   clutter free environment maintained   fall prevention program maintained   nonskid shoes/slippers when out of bed   room organization consistent   safety round/check completed   Goal Outcome Evaluation:                                             acetaminophen 325 mg oral tablet: 2 tab(s) orally every 6 hours As needed Mild Pain (1 - 3)  clopidogrel 75 mg oral tablet: 1 tab(s) orally once a day  metoprolol tartrate 25 mg oral tablet: 1 tab(s) orally 2 times a day  oxyCODONE 5 mg oral tablet: 1 tab(s) orally every 6 hours As needed Moderate Pain (4 - 6)  PARoxetine 10 mg oral tablet: 1 tab(s) orally once a day  polyethylene glycol 3350 oral powder for reconstitution: 17 gram(s) orally once a day  senna leaf extract oral tablet: 2 tab(s) orally once a day (at bedtime)